# Patient Record
Sex: FEMALE | Race: WHITE | Employment: OTHER | URBAN - METROPOLITAN AREA
[De-identification: names, ages, dates, MRNs, and addresses within clinical notes are randomized per-mention and may not be internally consistent; named-entity substitution may affect disease eponyms.]

---

## 2017-01-09 ENCOUNTER — TELEPHONE (OUTPATIENT)
Dept: ORTHOPEDICS | Facility: CLINIC | Age: 72
End: 2017-01-09

## 2017-01-09 ENCOUNTER — MEDICAL CORRESPONDENCE (OUTPATIENT)
Dept: HEALTH INFORMATION MANAGEMENT | Facility: CLINIC | Age: 72
End: 2017-01-09

## 2017-01-09 NOTE — TELEPHONE ENCOUNTER
Faxed back a signed plan/updated plan of progress to [place] at [phone]. Fax confirmed and sent to abstracting.  Tiffanie Ledezma Clinical Medical Assistant

## 2017-02-24 ENCOUNTER — TELEPHONE (OUTPATIENT)
Dept: ORTHOPEDICS | Facility: CLINIC | Age: 72
End: 2017-02-24

## 2017-02-24 NOTE — TELEPHONE ENCOUNTER
Faxed back a physician update to [place] at [phone]. Fax confirmed and sent to abstracting.  Tiffanie Ledezma Certified Medical Assistant

## 2017-04-17 ENCOUNTER — OFFICE VISIT (OUTPATIENT)
Dept: ORTHOPEDICS | Facility: CLINIC | Age: 72
End: 2017-04-17
Payer: COMMERCIAL

## 2017-04-17 ENCOUNTER — RADIANT APPOINTMENT (OUTPATIENT)
Dept: GENERAL RADIOLOGY | Facility: CLINIC | Age: 72
End: 2017-04-17
Attending: ORTHOPAEDIC SURGERY
Payer: COMMERCIAL

## 2017-04-17 VITALS — HEIGHT: 68 IN | BODY MASS INDEX: 31.83 KG/M2 | RESPIRATION RATE: 16 BRPM | WEIGHT: 210 LBS

## 2017-04-17 DIAGNOSIS — M17.11 PRIMARY OSTEOARTHRITIS OF RIGHT KNEE: Primary | ICD-10-CM

## 2017-04-17 DIAGNOSIS — M25.561 ACUTE PAIN OF RIGHT KNEE: ICD-10-CM

## 2017-04-17 PROCEDURE — 99214 OFFICE O/P EST MOD 30 MIN: CPT | Mod: 25 | Performed by: ORTHOPAEDIC SURGERY

## 2017-04-17 PROCEDURE — 20610 DRAIN/INJ JOINT/BURSA W/O US: CPT | Mod: RT | Performed by: ORTHOPAEDIC SURGERY

## 2017-04-17 PROCEDURE — 73562 X-RAY EXAM OF KNEE 3: CPT | Mod: RT

## 2017-04-17 RX ORDER — METHYLPREDNISOLONE ACETATE 80 MG/ML
80 INJECTION, SUSPENSION INTRA-ARTICULAR; INTRALESIONAL; INTRAMUSCULAR; SOFT TISSUE ONCE
Qty: 1 ML | Refills: 0 | OUTPATIENT
Start: 2017-04-17 | End: 2017-04-17

## 2017-04-17 RX ORDER — OXYCODONE HYDROCHLORIDE 5 MG/1
TABLET ORAL
COMMUNITY
Start: 2016-04-03 | End: 2021-06-17

## 2017-04-17 RX ORDER — FUROSEMIDE 20 MG
20 TABLET ORAL
COMMUNITY
End: 2021-06-17

## 2017-04-17 ASSESSMENT — PAIN SCALES - GENERAL: PAINLEVEL: MILD PAIN (3)

## 2017-04-17 NOTE — PROGRESS NOTES
CHIEF COMPLAINT:   Chief Complaint   Patient presents with     Knee Pain     Right knee pain. Onset: 11/2016. NKI. Pain starts at the knee and goes up to the hip. She thinks it is a muscle. She is not able to do her exercises to keep her leg strong enough to stand. No treatments. Pain is anterior knee.        HISTORY OF PRESENT ILLNESS    Kamila Strauss is a 71 year old female seen for evaluation of ongoing right knee pain with no known injury.   Pain has been present for 5 months. Pain today is rated 3/10. She reports pains starts at the knee an goes up into the hip. Feels it may be a muscle that is causing her pain. She is unable to do exercises to keep her leg strong enough to stand. She has had no treatments. Pain is located over the anterior and medial knee. Pain is aggravated with doing exercises.     She is currently in nursing facility for her Parkinsons, diagnosed within the past year.    Present symptoms: mild pain.  Anterior/medial knee, thigh.  Pain severity: 3/10  Frequency of symptoms: frequently  Exacerbating Factors: with doing exercises  Relieving Factors: rest  Night Pain: No  Pain while at rest: No   Numbness or tingling: No   Patient has tried:     NSAIDS: No      Physical Therapy: Yes      Activity modification: No      Bracing: No      Injections: No     Ice: No      Assistive device:  No     Other: none    Orthopaedic PMH: previous history of left knee pain/ osteoarthritis with injections.    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); Ataxia (9/15/2013); Back pain; Basal cell carcinoma of anterior chest (8/25/2014); Cataract; Chronic constipation; Chronic low back pain (9/16/2013); Colon polyp; Colon polyp; Deep venous thrombosis (H) (9/16/2013); Diverticulosis; Former smoker (9/16/2013); Gait disorder (8/22/2014); Hip replacement; Hyperlipidemia LDL goal < 130; Hypothyroid; Inflammatory arthritis; Localization-related (focal)  (partial) epilepsy and epileptic syndromes with complex partial seizures, with intractable epilepsy; Localization-related (focal) (partial) epilepsy and epileptic syndromes with complex partial seizures, without mention of intractable epilepsy; Major depressive disorder, recurrent episode, unspecified; Malignant neoplasm of cerebrum, except lobes and ventricles (H); Osteoporosis (7/8/2013); Other convulsions; and Seizure disorder (H). She also has no past medical history of Amblyopia; Bleeding disorder (H); Diabetes mellitus (H); Diabetic retinopathy (H); Glaucoma (increased eye pressure); Heart disease; Hypertension; Kidney disease; Retinal detachment; Senile macular degeneration; Strabismus; Stroke (H); Surgical complications; Type II or unspecified type diabetes mellitus without mention of complication, not stated as uncontrolled; Unspecified asthma(493.90); or Uveitis.  Patient Active Problem List    Diagnosis Date Noted     Meibomian gland dysfunction 01/13/2015     Priority: Medium     Health Care Home (Active) 10/23/2014     Priority: Medium       Care Coordinator:  Rekha Don    See Letters for H Care Plan  Date:  October 23, 2014             Basal cell carcinoma of anterior chest 08/25/2014     Priority: Medium     Abnormal brain MRI 08/25/2014     Priority: Medium     Indication:  Brain tumor, followup.  Astrocytoma.    Comparison:  04/29/2014.    Technique:  Multiplanar T1-T2, FLAIR and diffusion-weighted imaging.  Post gadolinium T1 sequences.    Findings:  Compared to the prior exam, there is a stable mass within the right posterior frontal lobe at the vertex measuring approximately 3.1 x 3.8 cm in transverse and AP dimensions with predominantly low T1 and high T2 signal intensity.  There are internal areas of T2 shortening which may represent degraded blood products or calcification.  Post-contrast imaging again demonstrates heterogeneous enhancement.  There is stable mild T2 prolongation within  the white matter inferior to the mass consistent with edema.    Otherwise, moderate generalized cerebral volume loss.  Patchy areas of T2/FLAIR signal within the periventricular and subcortical white matter which likely represent small vessel ischemic changes.  No acute intracranial hemorrhage.  No abnormal ventricular dilatation.  Intracranial vascular flow voids are preserved.  No mass or mass effect.  No midline shift.  No restricted diffusion to suggest acute ischemia.  No new areas of abnormal enhancement or enhancing lesions.  Bilateral orbits are unremarkable.  Normal appearing sella.  Fluid and mucosal thickening nearly completely opacifies the paranasal sinuses.  The mastoid air cells are clear.    Impression:   1. No interval change.   2. Stable size and appearance of the heterogeneous enhancing tumor in the posterior right frontal lobe at the vertex.  No evidence of progression of tumor.   3. Moderate generalized cerebral volume loss.  Chronic deep white matter small vessel ischemic changes.   4. No acute intracranial abnormality.   5. Sinusitis.    Dictated by Denzel Jacinto MD @ Aug 14 2014 10:08AM    Signed by Dr. Denzel Jacinto @ Aug 14 2014 10:14AM         Gait disorder 08/22/2014     Priority: Medium     Impingement syndrome of right shoulder 10/07/2013     Priority: Medium     Menopause 09/16/2013     Priority: Medium     Vaginal bleeding 7/15/2002 s/p d and c       Malaria 09/16/2013     Priority: Medium     1997 after returning from [place]       Former smoker 09/16/2013     Priority: Medium     Rotator cuff injury 09/16/2013     Priority: Medium     right       Deep venous thrombosis (H) 09/16/2013     Priority: Medium     Chronic low back pain 09/16/2013     Priority: Medium     Pelvic fracture (H) 09/16/2013     Priority: Medium     10/16/2010       Astrocytoma (H) 09/15/2013     Priority: Medium     Managed by Dr Suraj Camara MD [facility].       Ataxia 09/15/2013     Priority: Medium      Closed fracture of condyle of humerus 07/25/2013     Priority: Medium     Closed fracture lateral condyle humerus 07/22/2013     Priority: Medium     Accidental fall on or from stairs or steps 07/08/2013     Priority: Medium     Osteoporosis 07/08/2013     Priority: Medium     Vertebral fracture L3 post fall 8/28/2012 09/04/2012     Priority: Medium     Dr Ha in [facility].         Bradycardia, chronic 09/04/2012     Priority: Medium     Asymptomatic  No AV block on ECG  Normal QT interval       Vestibular neuropathy 09/04/2012     Priority: Medium     H/o lower extremity weakness. Chronic use of a walker due to imbalance  rxd with meclizine in 1997       Seizure disorder (H) 09/04/2012     Priority: Medium     History of bilat pulmonary embolism 2010 09/04/2012     Priority: Medium     Previously on coumadin but was discontinued and asa started  Fall risk       Encounter for counseling 08/29/2012     Priority: Medium     Overview:   Patient has identified Health Care Agent(s): Yes  Add Health Care Agents: Yes  Spouse Shmuel.  Health Care Agent(s):  Primary Health Care Agent:  Relationship:  Phone:    Secondary Health Care Agent:  Relationship:  Phone:    Conservator:  Relationship:  Phone:    Guardian: Relationship:  Phone:      Patient has Advance Care Plan Documents (Health Care Directive, POLST):     Patient has identified Specific Treatment Preferences: Full       Depressive disorder 08/29/2012     Priority: Medium     Fall 08/29/2012     Priority: Medium     Closed fracture of lumbar vertebra (H) 08/29/2012     Priority: Medium     Counseling for marital and partner problems 05/09/2012     Priority: Medium     Problem list name updated by automated process. Provider to review       Partial epilepsy with impairment of consciousness (H) 04/23/2012     Priority: Medium     Problem list name updated by automated process. Provider to review       Falls frequently 04/23/2012     Priority: Medium      Status post right hip replacement 1987 and 1993 10/21/2011     Priority: Medium     Cataract 09/12/2011     Priority: Medium     Utility update for deleted IMO code  [employer] Update utility       Hypothyroidism 04/18/2011     Priority: Medium     Advanced directives, counseling/discussion 04/18/2011     Priority: Medium     Patient has completed an Advance/Health Care Directive (HCD), to bring in copy to be scanned into [facility].    Padmini Nichole  April 18, 2011    Reviewed POLST  Signed by provider on 09/28/2012.    Raven Bermudez CMA    Health Care Directive received, reviewed for clarity and legality, and will be scanned into the patient's EMR chart.  Raven Bermudez CMA  ACP Facilitator             Constipation 04/18/2011     Priority: Medium     Major depressive disorder, recurrent episode (H)      Priority: Medium     Problem list name updated by automated process. Provider to review       Malignant Neoplasm of Cerebrum, 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      Priority: Medium     Grade II astrocytoma diagnosed 1987; s/p biopsy but no resection and 30 XRT  Had left focal sz sept 1997 and focal sz 10/21/2011  Mri brain 9/97, 9/98,. 4/00, 1/30/03 at [facility] 2005 shows no change. But recent jan 2013 showed  A change and dr. Camara had done scan July 2013 and another is due in late September or early October 2013.   Last seen neurosurgery 12/97  Followed by dr. Suraj camara 2013       Hyperlipidemia with target LDL less than 130      Priority: Medium     Diagnosis updated by automated process. Provider to review and confirm.       Pulmonary embolism and infarction (H) 09/30/2009     Priority: Medium     Epilepsy (H) 09/30/2009     Priority: Medium       Surgical Hx:  has a past surgical history that includes COLONOSCOPY THRU STOMA W BIOPSY/CAUTERY TUMOR/POLYP/LESION; RECONSTRUC HIP SOCKET; TOTAL KNEE ARTHROPLASTY; and biopsy (8/04/15).    Medications:   Current Outpatient Prescriptions:       furosemide (LASIX) 20 MG tablet, Take 20 mg by mouth, Disp: , Rfl:      oxyCODONE (ROXICODONE) 5 MG IR tablet, , Disp: , Rfl:      carbidopa-levodopa (SINEMET CR)  MG per tablet, Take 1 tablet by mouth At Bedtime, Disp: 90 tablet, Rfl: 1     carbidopa-levodopa (SINEMET)  MG per tablet, Take 2 tablets by mouth 3 times daily Taking 7am,11am,4pm, Disp: 180 tablet, Rfl: 5     sertraline (ZOLOFT) 100 MG tablet, Take 1.5 tablets (150 mg) by mouth daily, Disp: 135 tablet, Rfl: 3     vitamin E 400 UNIT capsule, Take 1 capsule (400 Units) by mouth daily, Disp: 30 capsule, Rfl:      cholecalciferol (VITAMIN D) 1000 UNIT tablet, 1000 units daily, Disp: 30 tablet, Rfl:      senna-docusate (SENOKOT-S;PERICOLACE) 8.6-50 MG per tablet, Take 2 tablets by mouth daily as needed, Disp: , Rfl:      polyethylene glycol (MIRALAX/GLYCOLAX) packet, Take 17 g by mouth daily as needed, Disp: , Rfl:      ORDER FOR DME, Equipment being ordered: Wheelchair, Disp: 1 Device, Rfl: 1     levothyroxine (SYNTHROID, LEVOTHROID) 112 MCG tablet, Take 1 tablet (112 mcg) by mouth daily, Disp: 90 tablet, Rfl: 1     pentoxifylline (TRENTAL) 400 MG CR tablet, Take 400 mg by mouth 3 times daily (with meals), Disp: , Rfl:      lamoTRIgine (LAMICTAL) 100 MG tablet, Take 200 mg by mouth 2 times daily, Disp: , Rfl:      ORDER FOR DME, Equipment being ordered: Commode seat lift without handles., Disp: 1 each, Rfl: 0     levetiracetam (KEPPRA) 750 MG tablet, Take 750 mg by mouth 2 times daily., Disp: , Rfl:      levetiracetam (KEPPRA) 500 MG tablet, Take 500 mg by mouth 2 times daily., Disp: , Rfl:      acetaminophen (TYLENOL) 500 MG tablet, Take 2-3 tablets by mouth every 6 hours as needed. 4 tabs qhs prn , Disp: , Rfl:      FISH OIL, 3 tablets daily., Disp: , Rfl:      ASPIRIN 81 MG OR TABS, 1 TABLET DAILY*, Disp: , Rfl:      Ondansetron HCl (ZOFRAN PO), Take 8 mg by mouth, Disp: , Rfl:      UNABLE TO FIND, MEDICATION NAME: Uristat (phenazopyridine)  190 mg oral, three times a day PRN, Disp: , Rfl:      HYDROcodone-acetaminophen (NORCO) 5-325 MG per tablet, Take 1 tablet by mouth every 6 hours as needed for moderate to severe pain, Disp: , Rfl:      fluocinonide (LIDEX) 0.05 % cream, Apply  topically 2 times daily., Disp: 45 g, Rfl: 0     calcium carbonate (OS-MIKE 600 MG [place]. [place]) 600 MG TABS tablet, Take 1,000 mg by mouth daily, Disp: , Rfl:      lacosamide (VIMPAT) 50 MG TABS, 50 mg daily DOSE UNKNOWN; 1 tab in the morning and 1 tab in the evening, Disp: 60 tablet, Rfl: 11     hydrocortisone valerate (WEST-SIENNA) 0.2 % ointment, Apply topically 2 times daily Use up to two weeks then on an as needed basis to affected areas on face., Disp: 60 g, Rfl: 0     CALCIUM + D OR, 1 TABLET DAILY, Disp: , Rfl:      MAGNESIUM OR, 1 TABLET DAILY, Disp: , Rfl:     Allergies:   Allergies   Allergen Reactions     Nickel Rash     Simvastatin [Hmg-Coa-R Inhibitors] Other (See Comments)     Muscle aches       Social Hx: patient with Parkinson's disease. .   reports that she quit smoking about 7 years ago. She has never used smokeless tobacco. She reports that she drinks alcohol. She reports that she does not use illicit drugs.    Family Hx: family history includes CANCER in her father; Family History Negative in an other family member; Thyroid Disease in her mother. There is no history of DIABETES, Hypertension, CEREBROVASCULAR DISEASE, Glaucoma, or Macular Degeneration.     This document serves as a record of the services and decisions personally performed and made by Alcides Laurent MD. It was created on his behalf by Jenny Ornelas, a trained medical scribe. The creation of this document is based the provider's statements to the medical scribe.    Mayela Ornelas 11:16 AM 4/17/2017     REVIEW OF SYSTEMS:  CONSTITUTIONAL:NEGATIVE for fever, chills, change in weight  INTEGUMENTARY/SKIN: NEGATIVE for worrisome rashes, moles or lesions  MUSCULOSKELETAL:See HPI above  NEURO:  "NEGATIVE for weakness, dizziness or paresthesias    PHYSICAL EXAM:  Resp 16  Ht 1.727 m (5' 8\")  Wt 95.3 kg (210 lb)  BMI 31.93 kg/m2   GENERAL APPEARANCE: healthy, alert, no distress  SKIN: no suspicious lesions or rashes  NEURO: Normal strength and tone, mentation intact and speech normal  PSYCH:  mentation appears normal and affect normal, not anxious  RESPIRATORY: No increased work of breathing.  HANDS: no clubbing, nail pitting.    BILATERAL LOWER EXTREMITIES:  Gait: wheelchair bound.  No gross deformities or masses.  Bilateral Quad atrophy, strength weak  Intact sensation deep peroneal nerve, superficial peroneal nerve, med/lat tibial nerve, sural nerve, saphenous nerve  Intact EHL, EDL, TA, FHL, GS, quadriceps hamstrings and hip flexors  Toes warm and well perfused, brisk capillary refill. Palpable 2+ dp pulses.  Bilateral calf soft and nttp or squeeze.  Edema: none      LEFT KNEE EXAM:    Skin: intact, no ecchymosis or erythema  ROM: 5 degrees extension to 115 degrees flexion  Tight hamstrings on straight leg raise.  Effusion: trace  Tender: diffuse med/lat joint line, anterior and posterior knee  McMurrays: negative     MCL: stable, and non-painful at both 0 and 30 degrees knee flexion  Varus stress: stable, and non-painful at both 0 and 30 degrees knee flexion  Lachmans: neg, firm endpoint  Posterior Drawer stable  Patellofemoral joint:                Apprehension: negative              Crepitations: positive   Grind: positive.    RIGHT KNEE EXAM:    Skin: intact, no ecchymosis or erythema  ROM: 0 extension to 120 flexion  Tight hamstrings on straight leg raise.  Effusion: none  Tender: medial joint line   NTTP lat joint line, anterior or posterior knee    MCL: stable, and non-painful at both 0 and 30 degrees knee flexion  Varus stress: stable, and non-painful at both 0 and 30 degrees knee flexion  Lachmans: neg, firm endpoint  Posterior Drawer stable  Patellofemoral joint:                Apprehension: " "negative              Crepitations: mild   Grind: positive.    X-RAY:  3 views right knee from 4/17/2017 were reviewed in clinic today. On my review, no obvious fractures or dislocations. There is moderate tricompartmental degenerative changes with marginal osteophytes, subchondral sclerosis.    Impression:   71 year old female with right knee pain, primary osteoarthritis.    Plan:   * reviewed imaging studies with patient, showing arthritis. Arthritis is wearing of the cartilage due to longstanding \"wear and tear\" or can follow an injury to the joint.    Discussed typical symptoms of arthritic pain is pain aggravated with weight bearing activities, stiffness, relieved by sitting or rest. Swelling may be associated. It is common to have some grinding and popping in the knee with arthritis.    Non-surgical treatment for knee arthritis includes:    * rest, sitting  * Activity modification - avoid impact activities or activities that aggravate symptoms.  * NSAIDS (non-steroidal anti-inflammatory medications; e.g. Aleve, advil, motrin, ibuprofen) - regular use for inflammation ( twice daily or three times daily), with food, as long as no contra-indications Please discuss with primary care doctor if needed  * ice, 15-20 minutes at a time several times a day or as needed.  * Strengthening of quadriceps muscles  * Physical Therapy for strengthening, stretching and range of motion exercises of legs  * Tylenol as needed for pain, consider Tylenol arthritis or similar  * Weight loss: Weight loss:  Body mass index is 31.93 kg/(m^2).. weight loss benefits, not only for the current pain symptoms, but also overall health. Recommend a good diet plan that works for the patient, with the assistance of a dietician or primary care doctor as needed. Also, a good, low-impact exercise program for at least 20 minutes per day, 3 times per week, such as exercise bike, elliptical , or pool.  * Exercise: low impact such as stationary " "bike, elliptical, pool.  * Injections: cortisone versus viscosupplementation (hyaluronic acid, \"rooster comb\", \"gel shots\"); risks and perceived benefits discussed today. Patient elects to proceed.  * Bracing: bracing the knee may offer some relief of symptoms when worn and provide some stability.  * over the counter supplements such as glucosamine and chondroitin sulfate may help with joint pain.  * topical ointments may help as well    * return to clinic as needed.    PROCEDURE NOTE:  The risks, perceived benefits and potential complications (including but not limited to: bleeding, infection, pain, scar, damage to adjacent structures, atrophy or necrosis of soft tissue, skin blanching, failure to relieve symptoms, worsening of symptoms, allergic reaction) of injection were discussed with the patient. Questions were addressed and answered.The patient elected to proceed. Written informed consent was obtained. The correct procedural site was identified and confirmed. A Right Knee intraarticular injection was performed using 1mL Depo Medrol 80mg per mL and 7mL (4mL 1% lidocaine, 3mL 0.25% marcaine)  of local anesthetic after sterile prep, to the correct procedural site. Sterile bandaid applied. This was tolerated well by the patient. No apparent complications. Did also discuss that if diabetic, recommend close monitoring of blood sugars over the next week as cortisone injections can temporarily elevate blood sugars.    The information in this document, created by a scribe for me, accurately reflects the services I personally performed and the decisions made by me. I have reviewed and approved this document for accuracy.      Alcides Laurent M.D., M.S.  Dept. of Orthopaedic Surgery  [facility]        "

## 2017-04-17 NOTE — PROGRESS NOTES
The patient's right knee was prepped with betadine solution after verification of allergies. Area approximately 10 cm x 10 cm prepped in a sterile fashion. After injection, betadine removed with soap and water and band-aids applied.    1ml depo-medrol with 1% lidocaine plain and 0.25% marcaine plain injected into patient's right knee by Mich Schneider PA-C    Depo-medrol  LOT# [id number]  Exp. 06/2019    Lidocaine  LOT# -[id number]  Exp. 09/01/2018    Marcaine  LOT# -[id number]  Exp. 07/01/2018    Mich Schneider PA-C  Supervising physician: Shmuel Lemus MD  Dept. of Orthopedics  [facility]

## 2017-04-17 NOTE — PATIENT INSTRUCTIONS
Please remember to call and schedule a follow up appointment if one was recommended at your earliest convenience.  Orthopedics CLINIC HOURS TELEPHONE NUMBER   Dr. Anastasiia Moreno  Certified Medical Assistant   Monday & Wednesday   8am - 5pm  Thursday 1pm - 5pm  Friday 8am -11:30am Specialty schedulers:   [phone] to schedule your surgery.  Main Clinic:   [phone] to make an appointment with any provider.    Urgent Care locations:    [facility] Monday-Friday Closed  Saturday-Sunday 9am-5pm      Monday-Friday 12pm - 8pm  Saturday-Sunday 9am-5pm [phone]     If SURGERY has been recommended, please call our Specialty Schedulers at [phone] to schedule your procedure.    If you need a medication refill, please contact your pharmacy. Please allow 3 business days for your refill to be completed.    If an MRI or CT scan has been recommended, please call [facility] Schedulers at [phone] to schedule your appointment.  Use [facility] (secure e-mail communication and access to your chart) to send a message or to make an appointment. Please ask how you can sign up for [facility].  Your care team's suggested websites for health information:   [email or web address] : Up to date and easily searchable information on multiple topics.   [email or web address] : [place] dept of heat, public health issues in [place], N1N1

## 2017-04-17 NOTE — Clinical Note
4/17/2017       RE: Kamila Strauss  [address] [place] [address]           Dear Colleague,    Thank you for referring your patient, Kamila Strauss, to the [facility] [place]. Please see a copy of my visit note below.    The patient's right knee was prepped with betadine solution after verification of allergies. Area approximately 10 cm x 10 cm prepped in a sterile fashion. After injection, betadine removed with soap and water and band-aids applied.    1ml depo-medrol with 1% lidocaine plain and 0.25% marcaine plain injected into patient's right knee by Mich Schneider PA-C    Depo-medrol  LOT# [id number]  Exp. 06/2019    Lidocaine  LOT# -[id number]  Exp. 09/01/2018    Marcaine  LOT# -[id number]  Exp. 07/01/2018    Mich Schneider PA-C  Supervising physician: Shmuel Lemus MD  Dept. of Orthopedics  [facility]            CHIEF COMPLAINT:   Chief Complaint   Patient presents with     Knee Pain     Right knee pain. Onset: 11/2016. NKI. Pain starts at the knee and goes up to the hip. She thinks it is a muscle. She is not able to do her exercises to keep her leg strong enough to stand. No treatments. Pain is anterior knee.    .    HISTORY OF PRESENT ILLNESS    Kamila Strauss is a 71 year old female seen for evaluation of ongoing right knee pain with no known injury.   Pain has been present for 5 months. Pain today is rated 3/10. She reports pains starts at the knee an goes up into the hip. Feels it may be a muscle that is causing her pain. She is unable to do exercises to keep her leg strong enough to stand. She has had no treatments. Pain is located over the anterior knee. Pain is aggravated with doing exercises.     She is currently in nursing facility for her Parkinsons, diagnosed within the past year.    Present symptoms: mild pain.    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); Ataxia (9/15/2013); Back pain; Basal cell carcinoma of anterior chest (8/25/2014); Cataract; Chronic constipation; Chronic low back pain (9/16/2013); Colon polyp; Colon polyp; Deep venous thrombosis (H) (9/16/2013); Diverticulosis; Former smoker (9/16/2013); Gait disorder (8/22/2014); Hip replacement; Hyperlipidemia LDL goal < 130; Hypothyroid; Inflammatory arthritis; Localization-related (focal) (partial) epilepsy and epileptic syndromes with complex partial seizures, with intractable epilepsy; Localization-related (focal) (partial) epilepsy and epileptic syndromes with complex partial seizures, without mention of intractable epilepsy; Major depressive disorder, recurrent episode, unspecified; Malignant neoplasm of cerebrum, except lobes and ventricles (H); Osteoporosis (7/8/2013); Other convulsions; and Seizure disorder (H). She also has no past medical history of Amblyopia; Bleeding disorder (H); Diabetes mellitus (H); Diabetic retinopathy (H); Glaucoma (increased eye pressure); Heart disease; Hypertension; Kidney disease; Retinal detachment; Senile macular degeneration; Strabismus; Stroke (H); Surgical complications; Type II or unspecified type diabetes mellitus without mention of complication, not stated as uncontrolled; Unspecified asthma(493.90); or Uveitis.    Surgical Hx:  has a past surgical history that includes COLONOSCOPY THRU STOMA W BIOPSY/CAUTERY TUMOR/POLYP/LESION; RECONSTRUC HIP SOCKET; TOTAL KNEE ARTHROPLASTY; and biopsy (8/04/15).    Medications:   Current Outpatient Prescriptions:      furosemide  (LASIX) 20 MG tablet, Take 20 mg by mouth, Disp: , Rfl:      oxyCODONE (ROXICODONE) 5 MG IR tablet, , Disp: , Rfl:      carbidopa-levodopa (SINEMET CR)  MG per tablet, Take 1 tablet by mouth At Bedtime, Disp: 90 tablet, Rfl: 1     carbidopa-levodopa (SINEMET)  MG per tablet, Take 2 tablets by mouth 3 times daily Taking 7am,11am,4pm, Disp: 180 tablet, Rfl: 5     sertraline (ZOLOFT) 100 MG tablet, Take 1.5 tablets (150 mg) by mouth daily, Disp: 135 tablet, Rfl: 3     vitamin E 400 UNIT capsule, Take 1 capsule (400 Units) by mouth daily, Disp: 30 capsule, Rfl:      cholecalciferol (VITAMIN D) 1000 UNIT tablet, 1000 units daily, Disp: 30 tablet, Rfl:      senna-docusate (SENOKOT-S;PERICOLACE) 8.6-50 MG per tablet, Take 2 tablets by mouth daily as needed, Disp: , Rfl:      polyethylene glycol (MIRALAX/GLYCOLAX) packet, Take 17 g by mouth daily as needed, Disp: , Rfl:      ORDER FOR DME, Equipment being ordered: Wheelchair, Disp: 1 Device, Rfl: 1     levothyroxine (SYNTHROID, LEVOTHROID) 112 MCG tablet, Take 1 tablet (112 mcg) by mouth daily, Disp: 90 tablet, Rfl: 1     pentoxifylline (TRENTAL) 400 MG CR tablet, Take 400 mg by mouth 3 times daily (with meals), Disp: , Rfl:      lamoTRIgine (LAMICTAL) 100 MG tablet, Take 200 mg by mouth 2 times daily, Disp: , Rfl:      ORDER FOR DME, Equipment being ordered: Commode seat lift without handles., Disp: 1 each, Rfl: 0     levetiracetam (KEPPRA) 750 MG tablet, Take 750 mg by mouth 2 times daily., Disp: , Rfl:      levetiracetam (KEPPRA) 500 MG tablet, Take 500 mg by mouth 2 times daily., Disp: , Rfl:      acetaminophen (TYLENOL) 500 MG tablet, Take 2-3 tablets by mouth every 6 hours as needed. 4 tabs qhs prn , Disp: , Rfl:      FISH OIL, 3 tablets daily., Disp: , Rfl:      ASPIRIN 81 MG OR TABS, 1 TABLET DAILY*, Disp: , Rfl:      Ondansetron HCl (ZOFRAN PO), Take 8 mg by mouth, Disp: , Rfl:      UNABLE TO FIND, MEDICATION NAME: Uristat (phenazopyridine) 190 mg oral,  three times a day PRN, Disp: , Rfl:      HYDROcodone-acetaminophen (NORCO) 5-325 MG per tablet, Take 1 tablet by mouth every 6 hours as needed for moderate to severe pain, Disp: , Rfl:      fluocinonide (LIDEX) 0.05 % cream, Apply  topically 2 times daily., Disp: 45 g, Rfl: 0     calcium carbonate (OS-MIKE 600 MG [place]. [place]) 600 MG TABS tablet, Take 1,000 mg by mouth daily, Disp: , Rfl:      lacosamide (VIMPAT) 50 MG TABS, 50 mg daily DOSE UNKNOWN; 1 tab in the morning and 1 tab in the evening, Disp: 60 tablet, Rfl: 11     hydrocortisone valerate (WEST-SIENNA) 0.2 % ointment, Apply topically 2 times daily Use up to two weeks then on an as needed basis to affected areas on face., Disp: 60 g, Rfl: 0     CALCIUM + D OR, 1 TABLET DAILY, Disp: , Rfl:      MAGNESIUM OR, 1 TABLET DAILY, Disp: , Rfl:     Allergies:   Allergies   Allergen Reactions     Nickel Rash     Simvastatin [Hmg-Coa-R Inhibitors] Other (See Comments)     Muscle aches       Social Hx: patient with Parkinson's disease. .   reports that she quit smoking about 7 years ago. She has never used smokeless tobacco. She reports that she drinks alcohol. She reports that she does not use illicit drugs.    Family Hx: family history includes CANCER in her father; Family History Negative in an other family member; Thyroid Disease in her mother. There is no history of DIABETES, Hypertension, CEREBROVASCULAR DISEASE, Glaucoma, or Macular Degeneration.     This document serves as a record of the services and decisions personally performed and made by Alcides Laurent MD. It was created on his behalf by Jenny Ornelas, a trained medical scribe. The creation of this document is based the provider's statements to the medical scribe.    Ernstibmarychuy Ornelas 11:16 AM 4/17/2017     REVIEW OF SYSTEMS: 10 point ROS neg other than the symptoms noted above in the HPI and PMH. Notables include  CONSTITUTIONAL:NEGATIVE for fever, chills, change in weight  INTEGUMENTARY/SKIN: NEGATIVE for  "worrisome rashes, moles or lesions  MUSCULOSKELETAL:See HPI above  NEURO: NEGATIVE for weakness, dizziness or paresthesias    PHYSICAL EXAM:  Resp 16  Ht 1.727 m (5' 8\")  Wt 95.3 kg (210 lb)  BMI 31.93 kg/m2   GENERAL APPEARANCE: healthy, alert, no distress  SKIN: no suspicious lesions or rashes  NEURO: Normal strength and tone, mentation intact and speech normal  PSYCH:  mentation appears normal and affect normal, not anxious  RESPIRATORY: No increased work of breathing.  HANDS: no clubbing, nail pitting.    BILATERAL LOWER EXTREMITIES:  Gait: wheelchair bound.  No gross deformities or masses.  No Quad atrophy, strength normal.  Intact sensation deep peroneal nerve, superficial peroneal nerve, med/lat tibial nerve, sural nerve, saphenous nerve  Intact EHL, EDL, TA, FHL, GS, quadriceps hamstrings and hip flexors  Toes warm and well perfused, brisk capillary refill. Palpable 2+ dp pulses.  Bilateral calf soft and nttp or squeeze.  No palpable popliteal lymphadenopathy.  DTRs: achilles 2+, patella 2+.  Edema: none  Hips with full, pain-free motion. No irritability with flexion, adduction, and internal rotation.    LEFT KNEE EXAM:    Skin: intact, no ecchymosis or erythema  ROM: 5 degrees extension to 115 degrees flexion  Tight hamstrings on straight leg raise.  Effusion: trace  Tender: diffuse med/lat joint line, anterior or posterior knee  McMurrays: negative     MCL: stable, and non-painful at both 0 and 30 degrees knee flexion  Varus stress: stable, and non-painful at both 0 and 30 degrees knee flexion  Lachmans: neg, firm endpoint  Posterior Drawer stable  Patellofemoral joint:                Apprehension: negative              Crepitations: positive   Grind: positive.    RIGHT KNEE EXAM:    Skin: intact, no ecchymosis or erythema  Squat: 100 %, not limited by pain.     ROM: 0 extension to 120 flexion  Tight hamstrings on straight leg raise.  Effusion: none  Tender: medial joint line   NTTP lat joint line, " "anterior or posterior knee  McMurrays: negative    MCL: stable, and non-painful at both 0 and 30 degrees knee flexion  Varus stress: stable, and non-painful at both 0 and 30 degrees knee flexion  Lachmans: neg, firm endpoint  Posterior Drawer stable  Patellofemoral joint:                Apprehension: negative              Crepitations: minimal   Grind: positive.    X-RAY:  3 views right knee from 4/17/2017 were reviewed in clinic today. On my review, no obvious fractures or dislocations.     Impression:   71 year old female with right knee pain, osteoarthritis.    Plan:   * reviewed imaging studies with patient, showing arthritis. Arthritis is wearing of the cartilage due to longstanding \"wear and tear\" or can follow an injury to the joint.    Discussed typical symptoms of arthritic pain is pain aggravated with weight bearing activities, stiffness, relieved by sitting or rest. Swelling may be associated. It is common to have some grinding and popping in the knee with arthritis.    Workup for degenerative knee arthrosis and pain, typically starts with plain xrays of the knee. Xrays are usually all that is needed for evaluation of ongoing knee pain for arthritis, as they show the bony anatomy well and underlying degenerative changes. An MRI is not usually needed in cases of degenerative knee pain and arthritis, as degenerative cartilage and meniscal changes seen on MRI are expected, given findings on xray. MRI may be indicated if the arthritis is mild and there are mechanical symptoms such as locking or catching in the knee, or an acute knee injury.    Discussed various treatments for degenerative arthrosis of the knee, including nonoperative and operative approaches. Nonoperative approaches, which are exhausted prior to operative treatment, include doing nothing and living with the pain as patient has been doing, activity modification (avoid aggravating activities), physical therapy and strengthening exercises, " weight loss, anti-inflammatory medications, bracing, ambulatory aids (cane, walker) and injections. Once these have been tried and are deemed unsuccessful with a good effort, and the patient is an appropriate candidate, the next treatment would be knee arthroplasty or replacement. Depending on the location of the arthritis, knee replacement can be partial (one side of the knee affected by arthritis) or a total knee arthroplasty (all 3 compartments). The risks, perceived benefits and perioperative rehabilitation expectations of knee arthroplasty were discussed in detail. Also discussed that approximately 10% of patients that undergo knee arthroplasty are not happy following surgery and may have worse pain or no improvement in pain, contrary to their preoperative expectations.    At this time, nonoperative treatment will be pursued.  Non-surgical treatment for knee arthritis includes:    * rest, sitting  * Activity modification - avoid impact activities or activities that aggravate symptoms.  * NSAIDS (non-steroidal anti-inflammatory medications; e.g. Aleve, advil, motrin, ibuprofen) - regular use for inflammation ( twice daily or three times daily), with food, as long as no contra-indications Please discuss with primary care doctor if needed  * ice, 15-20 minutes at a time several times a day or as needed.  * Strengthening of quadriceps muscles  * Physical Therapy for strengthening, stretching and range of motion exercises of legs  * Tylenol as needed for pain, consider Tylenol arthritis or similar  * Weight loss: Weight loss:  Body mass index is 31.93 kg/(m^2).. weight loss benefits, not only for the current pain symptoms, but also overall health. Recommend a good diet plan that works for the patient, with the assistance of a dietician or primary care doctor as needed. Also, a good, low-impact exercise program for at least 20 minutes per day, 3 times per week, such as exercise bike, elliptical , or pool.  *  "Exercise: low impact such as stationary bike, elliptical, pool.  * Injections: cortisone versus viscosupplementation (hyaluronic acid, \"rooster comb\", \"gel shots\"); risks and perceived benefits discussed today. Patient elects to proceed.  * Bracing: bracing the knee may offer some relief of symptoms when worn and provide some stability.  * over the counter supplements such as glucosamine and chondroitin sulfate may help with joint pain.  * topical ointments may help as well    * return to clinic as needed.    PROCEDURE NOTE:  The risks, perceived benefits and potential complications (including but not limited to: bleeding, infection, pain, scar, damage to adjacent structures, atrophy or necrosis of soft tissue, skin blanching, failure to relieve symptoms, worsening of symptoms, allergic reaction) of injection were discussed with the patient. Questions were addressed and answered.The patient elected to proceed. Written informed consent was obtained. The correct procedural site was identified and confirmed. A Right Knee intraarticular injection was performed using 1mL Depo Medrol 80mg per mL and 7mL (4mL 1% lidocaine, 3mL 0.25% marcaine)  of local anesthetic after sterile prep, to the correct procedural site. Sterile bandaid applied. This was tolerated well by the patient. No apparent complications. Did also discuss that if diabetic, recommend close monitoring of blood sugars over the next week as cortisone injections can temporarily elevate blood sugars.    The information in this document, created by a scribe for me, accurately reflects the services I personally performed and the decisions made by me. I have reviewed and approved this document for accuracy.      Alcides Laurent M.D., M.S.  Dept. of Orthopaedic Surgery  [facility]       Alcides Laurent M.D., M.S.  Dept. of Orthopaedic Surgery  [facility]          Again, thank you for allowing me to participate in the care of your patient.  "       Sincerely,              Alcides Laurent MD

## 2017-04-17 NOTE — MR AVS SNAPSHOT
After Visit Summary   4/17/2017    Kamila Strauss    MRN: [id number]           Patient Information     Date Of Birth          1945        Visit Information        Provider Department      4/17/2017 10:15 AM Alcides Laurent MD [facility]        Today's Diagnoses     Primary osteoarthritis of right knee    -  1    Acute pain of right knee          Care Instructions    Please remember to call and schedule a follow up appointment if one was recommended at your earliest convenience.  Orthopedics CLINIC HOURS TELEPHONE NUMBER   Dr. Anastasiia Moreno  Certified Medical Assistant   Monday & Wednesday   8am - 5pm  Thursday 1pm - 5pm  Friday 8am -11:30am Specialty schedulers:   [phone] to schedule your surgery.  Main Clinic:   [phone] to make an appointment with any provider.    Urgent Care locations:    [facility] Monday-Friday Closed  Saturday-Sunday 9am-5pm      Monday-Friday 12pm - 8pm  Saturday-Sunday 9am-5pm [phone]     If SURGERY has been recommended, please call our Specialty Schedulers at [phone] to schedule your procedure.    If you need a medication refill, please contact your pharmacy. Please allow 3 business days for your refill to be completed.    If an MRI or CT scan has been recommended, please call [facility] Schedulers at [phone] to schedule your appointment.  Use [employer] (secure e-mail communication and access to your chart) to send a message or to make an appointment. Please ask how you can sign up for [employer].  Your care team's suggested websites for health information:   Www.8bit.org : Up to date and easily searchable information on multiple topics.   [email or web address] : [place] dept of heat, public health issues in [place], N1N1            Follow-ups after your visit        Follow-up notes from your care team     Return if symptoms worsen or fail to  "improve.      Who to contact     If you have questions or need follow up information about today's clinic visit or your schedule please contact [facility] directly at [phone].  Normal or non-critical lab and imaging results will be communicated to you by MyChart, letter or phone within 4 business days after the clinic has received the results. If you do not hear from us within 7 days, please contact the clinic through [employer] or phone. If you have a critical or abnormal lab result, we will notify you by phone as soon as possible.  Submit refill requests through [employer] or call your pharmacy and they will forward the refill request to us. Please allow 3 business days for your refill to be completed.          Additional Information About Your Visit        [employer] Information     [employer] gives you secure access to your electronic health record. If you see a primary care provider, you can also send messages to your care team and make appointments. If you have questions, please call your primary care clinic.  If you do not have a primary care provider, please call [phone] and they will assist you.        Care EveryWhere ID     This is your Care EveryWhere ID. This could be used by other organizations to access your [facility] medical records  [id number]        Your Vitals Were     Respirations Height BMI (Body Mass Index)             16 5' 8\" (1.727 m) 31.93 kg/m2          Blood Pressure from Last 3 Encounters:   11/03/14 140/90   09/03/14 130/82   08/25/14 133/77    Weight from Last 3 Encounters:   04/17/17 210 lb (95.3 kg)   10/10/16 210 lb (95.3 kg)   08/31/15 210 lb (95.3 kg)              We Performed the Following     DRAIN/INJECT LARGE JOINT/BURSA     METHYLPREDNISOLONE 80 MG INJ          Today's Medication Changes          These changes are accurate as of: 4/17/17  4:10 PM.  If you have any questions, ask your nurse or doctor.               Start taking these medicines.  "       Dose/Directions    methylPREDNISolone acetate 80 MG/ML injection   Commonly known as:  DEPO-MEDROL   Used for:  Acute pain of right knee   Started by:  Alcides Laurent MD        Dose:  80 mg   1 mL (80 mg) by INTRA-ARTICULAR route once for 1 dose   Quantity:  1 mL   Refills:  0            Where to get your medicines      Some of these will need a paper prescription and others can be bought over the counter.  Ask your nurse if you have questions.     You don't need a prescription for these medications     methylPREDNISolone acetate 80 MG/ML injection                Primary Care Provider Office Phone # Fax #    Ashley Dumont Naveed Reed [phone]       [facility] [id number]  [facility] [address]        Thank you!     Thank you for choosing [facility]  for your care. Our goal is always to provide you with excellent care. Hearing back from our patients is one way we can continue to improve our services. Please take a few minutes to complete the written survey that you may receive in the mail after your visit with us. Thank you!             Your Updated Medication List - Protect others around you: Learn how to safely use, store and throw away your medicines at www.disposemymeds.org.          This list is accurate as of: 4/17/17  4:10 PM.  Always use your most recent med list.                   Brand Name Dispense Instructions for use    acetaminophen 500 MG tablet    TYLENOL     Take 2-3 tablets by mouth every 6 hours as needed. 4 tabs qhs prn       aspirin 81 MG tablet      1 TABLET DAILY*       CALCIUM + D PO      1 TABLET DAILY       calcium carbonate 600 MG tablet    OS-MIKE 600 mg Samish. Ca     Take 1,000 mg by mouth daily       * carbidopa-levodopa  MG per CR tablet    SINEMET CR    90 tablet    Take 1 tablet by mouth At Bedtime       * carbidopa-levodopa  MG per tablet    SINEMET    180 tablet    Take 2 tablets by  mouth 3 times daily Taking 7am,11am,4pm       cholecalciferol 1000 UNIT tablet    vitamin D    30 tablet    1000 units daily       FISH OIL      3 tablets daily.       fluocinonide 0.05 % cream    LIDEX    45 g    Apply  topically 2 times daily.       furosemide 20 MG tablet    LASIX     Take 20 mg by mouth       HYDROcodone-acetaminophen 5-325 MG per tablet    NORCO     Take 1 tablet by mouth every 6 hours as needed for moderate to severe pain       hydrocortisone valerate 0.2 % ointment    WEST-SIENNA    60 g    Apply topically 2 times daily Use up to two weeks then on an as needed basis to affected areas on face.       lamoTRIgine 100 MG tablet    LaMICtal     Take 200 mg by mouth 2 times daily       * levETIRAcetam 750 MG tablet    KEPPRA     Take 750 mg by mouth 2 times daily.       * levETIRAcetam 500 MG tablet    KEPPRA     Take 500 mg by mouth 2 times daily.       levothyroxine 112 MCG tablet    SYNTHROID/LEVOTHROID    90 tablet    Take 1 tablet (112 mcg) by mouth daily       MAGNESIUM PO      1 TABLET DAILY       methylPREDNISolone acetate 80 MG/ML injection    DEPO-MEDROL    1 mL    1 mL (80 mg) by INTRA-ARTICULAR route once for 1 dose       order for DME     1 each    Equipment being ordered: Commode seat lift without handles.       order for DME     1 Device    Equipment being ordered: Wheelchair       oxyCODONE 5 MG IR tablet    ROXICODONE         polyethylene glycol Packet    MIRALAX/GLYCOLAX     Take 17 g by mouth daily as needed       senna-docusate 8.6-50 MG per tablet    SENOKOT-S;PERICOLACE     Take 2 tablets by mouth daily as needed       sertraline 100 MG tablet    ZOLOFT    135 tablet    Take 1.5 tablets (150 mg) by mouth daily       TRENTAL 400 MG CR tablet   Generic drug:  pentoxifylline      Take 400 mg by mouth 3 times daily (with meals)       UNABLE TO FIND      MEDICATION NAME: Uristat (phenazopyridine) 190 mg oral, three times a day PRN       VIMPAT 50 MG Tabs tablet   Generic drug:   lacosamide     60 tablet    50 mg daily DOSE UNKNOWN; 1 tab in the morning and 1 tab in the evening       vitamin E 400 UNIT capsule     30 capsule    Take 1 capsule (400 Units) by mouth daily       ZOFRAN PO      Take 8 mg by mouth       * Notice:  This list has 4 medication(s) that are the same as other medications prescribed for you. Read the directions carefully, and ask your doctor or other care provider to review them with you.

## 2017-04-17 NOTE — NURSING NOTE
"Chief Complaint   Patient presents with     Knee Pain     Right knee pain. Onset: 11/2016. NKI. Pain starts at the knee and goes up to the hip. She thinks it is a muscle. She is not able to do her exercises to keep her leg strong enough to stand. No treatments. Pain is anterior knee.        Initial Resp 16  Ht 1.727 m (5' 8\")  Wt 95.3 kg (210 lb)  BMI 31.93 kg/m2 Estimated body mass index is 31.93 kg/(m^2) as calculated from the following:    Height as of this encounter: 1.727 m (5' 8\").    Weight as of this encounter: 95.3 kg (210 lb).  Medication Reconciliation: shashank Ledezma Certified Medical Assistant    "

## 2017-04-17 NOTE — LETTER
4/17/2017      RE: Kamila Strauss  [address] [facility] [place] [address]       The patient's right knee was prepped with betadine solution after verification of allergies. Area approximately 10 cm x 10 cm prepped in a sterile fashion. After injection, betadine removed with soap and water and band-aids applied.    1ml depo-medrol with 1% lidocaine plain and 0.25% marcaine plain injected into patient's right knee by Mich Schneider PA-C    Depo-medrol  LOT# [id number]  Exp. 06/2019    Lidocaine  LOT# -[id number]  Exp. 09/01/2018    Marcaine  LOT# -[id number]  Exp. 07/01/2018    Mich Schneider PA-C  Supervising physician: Shmuel Lemus MD  Dept. of Orthopedics  [facility]            CHIEF COMPLAINT:   Chief Complaint   Patient presents with     Knee Pain     Right knee pain. Onset: 11/2016. NKI. Pain starts at the knee and goes up to the hip. She thinks it is a muscle. She is not able to do her exercises to keep her leg strong enough to stand. No treatments. Pain is anterior knee.        HISTORY OF PRESENT ILLNESS    Kamila Strauss is a 71 year old female seen for evaluation of ongoing right knee pain with no known injury.   Pain has been present for 5 months. Pain today is rated 3/10. She reports pains starts at the knee an goes up into the hip. Feels it may be a muscle that is causing her pain. She is unable to do exercises to keep her leg strong enough to stand. She has had no treatments. Pain is located over the anterior and medial knee. Pain is aggravated with doing exercises.     She is currently in nursing facility for her Parkinsons, diagnosed within the past year.    Present symptoms: mild pain.  Anterior/medial knee, thigh.  Pain severity: 3/10  Frequency of symptoms: frequently  Exacerbating Factors: with doing exercises  Relieving Factors: rest  Night Pain: No  Pain while at rest: No   Numbness or tingling: No   Patient has tried:     NSAIDS: No      Physical Therapy: Yes      Activity  modification: No      Bracing: No      Injections: No     Ice: No      Assistive device:  No     Other: none    Orthopaedic PMH: previous history of left knee pain/ osteoarthritis with injections.    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); Ataxia (9/15/2013); Back pain; Basal cell carcinoma of anterior chest (8/25/2014); Cataract; Chronic constipation; Chronic low back pain (9/16/2013); Colon polyp; Colon polyp; Deep venous thrombosis (H) (9/16/2013); Diverticulosis; Former smoker (9/16/2013); Gait disorder (8/22/2014); Hip replacement; Hyperlipidemia LDL goal < 130; Hypothyroid; Inflammatory arthritis; Localization-related (focal) (partial) epilepsy and epileptic syndromes with complex partial seizures, with intractable epilepsy; Localization-related (focal) (partial) epilepsy and epileptic syndromes with complex partial seizures, without mention of intractable epilepsy; Major depressive disorder, recurrent episode, unspecified; Malignant neoplasm of cerebrum, except lobes and ventricles (H); Osteoporosis (7/8/2013); Other convulsions; and Seizure disorder (H). She also has no past medical history of Amblyopia; Bleeding disorder (H); Diabetes mellitus (H); Diabetic retinopathy (H); Glaucoma (increased eye pressure); Heart disease; Hypertension; Kidney disease; Retinal detachment; Senile macular degeneration; Strabismus; Stroke (H); Surgical complications; Type II or unspecified type diabetes mellitus without mention of complication, not stated as uncontrolled; Unspecified asthma(493.90); or Uveitis.  Patient Active Problem List    Diagnosis Date Noted     Meibomian gland dysfunction 01/13/2015     Priority: Medium     Health Care Home (Active) 10/23/2014     Priority: Medium       Care Coordinator:  Rekha Don    See Letters for [facility] Care Plan  Date:  October 23, 2014             Basal cell carcinoma of anterior chest 08/25/2014     Priority: Medium      Abnormal brain MRI 08/25/2014     Priority: Medium     Indication:  Brain tumor, followup.  Astrocytoma.    Comparison:  04/29/2014.    Technique:  Multiplanar T1-T2, FLAIR and diffusion-weighted imaging.  Post gadolinium T1 sequences.    Findings:  Compared to the prior exam, there is a stable mass within the right posterior frontal lobe at the vertex measuring approximately 3.1 x 3.8 cm in transverse and AP dimensions with predominantly low T1 and high T2 signal intensity.  There are internal areas of T2 shortening which may represent degraded blood products or calcification.  Post-contrast imaging again demonstrates heterogeneous enhancement.  There is stable mild T2 prolongation within the white matter inferior to the mass consistent with edema.    Otherwise, moderate generalized cerebral volume loss.  Patchy areas of T2/FLAIR signal within the periventricular and subcortical white matter which likely represent small vessel ischemic changes.  No acute intracranial hemorrhage.  No abnormal ventricular dilatation.  Intracranial vascular flow voids are preserved.  No mass or mass effect.  No midline shift.  No restricted diffusion to suggest acute ischemia.  No new areas of abnormal enhancement or enhancing lesions.  Bilateral orbits are unremarkable.  Normal appearing sella.  Fluid and mucosal thickening nearly completely opacifies the paranasal sinuses.  The mastoid air cells are clear.    Impression:   1. No interval change.   2. Stable size and appearance of the heterogeneous enhancing tumor in the posterior right frontal lobe at the vertex.  No evidence of progression of tumor.   3. Moderate generalized cerebral volume loss.  Chronic deep white matter small vessel ischemic changes.   4. No acute intracranial abnormality.   5. Sinusitis.    Dictated by Denzel Jacinto MD @ Aug 14 2014 10:08AM    Signed by Dr. Denzel Jacinto @ Aug 14 2014 10:14AM         Gait disorder 08/22/2014     Priority: Medium     Impingement  syndrome of right shoulder 10/07/2013     Priority: Medium     Menopause 09/16/2013     Priority: Medium     Vaginal bleeding 7/15/2002 s/p d and c       Malaria 09/16/2013     Priority: Medium     1997 after returning from [place]       Former smoker 09/16/2013     Priority: Medium     Rotator cuff injury 09/16/2013     Priority: Medium     right       Deep venous thrombosis (H) 09/16/2013     Priority: Medium     Chronic low back pain 09/16/2013     Priority: Medium     Pelvic fracture (H) 09/16/2013     Priority: Medium     10/16/2010       Astrocytoma (H) 09/15/2013     Priority: Medium     Managed by Dr Suraj Camara MD [facility].       Ataxia 09/15/2013     Priority: Medium     Closed fracture of condyle of humerus 07/25/2013     Priority: Medium     Closed fracture lateral condyle humerus 07/22/2013     Priority: Medium     Accidental fall on or from stairs or steps 07/08/2013     Priority: Medium     Osteoporosis 07/08/2013     Priority: Medium     Vertebral fracture L3 post fall 8/28/2012 09/04/2012     Priority: Medium     Dr Ha in [facility].         Bradycardia, chronic 09/04/2012     Priority: Medium     Asymptomatic  No AV block on ECG  Normal QT interval       Vestibular neuropathy 09/04/2012     Priority: Medium     H/o lower extremity weakness. Chronic use of a walker due to imbalance  rxd with meclizine in 1997       Seizure disorder (H) 09/04/2012     Priority: Medium     History of bilat pulmonary embolism 2010 09/04/2012     Priority: Medium     Previously on coumadin but was discontinued and asa started  Fall risk       Encounter for counseling 08/29/2012     Priority: Medium     Overview:   Patient has identified Health Care Agent(s): Yes  Add Health Care Agents: Yes  Spouse Shmuel.  Health Care Agent(s):  Primary Health Care Agent:  Relationship:  Phone:    Secondary Health Care Agent:  Relationship:  Phone:    Conservator:  Relationship:  Phone:    Guardian:  Relationship:  Phone:      Patient has Advance Care Plan Documents (Health Care Directive, POLST):     Patient has identified Specific Treatment Preferences: Full       Depressive disorder 08/29/2012     Priority: Medium     Fall 08/29/2012     Priority: Medium     Closed fracture of lumbar vertebra (H) 08/29/2012     Priority: Medium     Counseling for marital and partner problems 05/09/2012     Priority: Medium     Problem list name updated by automated process. Provider to review       Partial epilepsy with impairment of consciousness (H) 04/23/2012     Priority: Medium     Problem list name updated by automated process. Provider to review       Falls frequently 04/23/2012     Priority: Medium     Status post right hip replacement 1987 and 1993 10/21/2011     Priority: Medium     Cataract 09/12/2011     Priority: Medium     Utility update for deleted IMO code  Im[employer] Update utility       Hypothyroidism 04/18/2011     Priority: Medium     Advanced directives, counseling/discussion 04/18/2011     Priority: Medium     Patient has completed an Advance/Health Care Directive (HCD), to bring in copy to be scanned into [employer].    Padmini Nichole  April 18, 2011    Reviewed POLST  Signed by provider on 09/28/2012.    Raven Bermudez CMA    Health Care Directive received, reviewed for clarity and legality, and will be scanned into the patient's EMR chart.  Raven Bermudez CMA  ACP Facilitator             Constipation 04/18/2011     Priority: Medium     Major depressive disorder, recurrent episode (H)      Priority: Medium     Problem list name updated by automated process. Provider to review       Malignant Neoplasm of Cerebrum, 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      Priority: Medium     Grade II astrocytoma diagnosed 1987; s/p biopsy but no resection and 30 XRT  Had left focal sz sept 1997 and focal sz 10/21/2011  Mri brain 9/97, 9/98,. 4/00, 1/30/03 at [facility] 2005 shows no change. But recent jan 2013  showed  A change and dr. Camara had done scan July 2013 and another is due in late September or early October 2013.   Last seen neurosurgery 12/97  Followed by dr. Suraj camara 2013       Hyperlipidemia with target LDL less than 130      Priority: Medium     Diagnosis updated by automated process. Provider to review and confirm.       Pulmonary embolism and infarction (H) 09/30/2009     Priority: Medium     Epilepsy (H) 09/30/2009     Priority: Medium       Surgical Hx:  has a past surgical history that includes COLONOSCOPY THRU STOMA W BIOPSY/CAUTERY TUMOR/POLYP/LESION; RECONSTRUC HIP SOCKET; TOTAL KNEE ARTHROPLASTY; and biopsy (8/04/15).    Medications:   Current Outpatient Prescriptions:      furosemide (LASIX) 20 MG tablet, Take 20 mg by mouth, Disp: , Rfl:      oxyCODONE (ROXICODONE) 5 MG IR tablet, , Disp: , Rfl:      carbidopa-levodopa (SINEMET CR)  MG per tablet, Take 1 tablet by mouth At Bedtime, Disp: 90 tablet, Rfl: 1     carbidopa-levodopa (SINEMET)  MG per tablet, Take 2 tablets by mouth 3 times daily Taking 7am,11am,4pm, Disp: 180 tablet, Rfl: 5     sertraline (ZOLOFT) 100 MG tablet, Take 1.5 tablets (150 mg) by mouth daily, Disp: 135 tablet, Rfl: 3     vitamin E 400 UNIT capsule, Take 1 capsule (400 Units) by mouth daily, Disp: 30 capsule, Rfl:      cholecalciferol (VITAMIN D) 1000 UNIT tablet, 1000 units daily, Disp: 30 tablet, Rfl:      senna-docusate (SENOKOT-S;PERICOLACE) 8.6-50 MG per tablet, Take 2 tablets by mouth daily as needed, Disp: , Rfl:      polyethylene glycol (MIRALAX/GLYCOLAX) packet, Take 17 g by mouth daily as needed, Disp: , Rfl:      ORDER FOR DME, Equipment being ordered: Wheelchair, Disp: 1 Device, Rfl: 1     levothyroxine (SYNTHROID, LEVOTHROID) 112 MCG tablet, Take 1 tablet (112 mcg) by mouth daily, Disp: 90 tablet, Rfl: 1     pentoxifylline (TRENTAL) 400 MG CR tablet, Take 400 mg by mouth 3 times daily (with meals), Disp: , Rfl:      lamoTRIgine (LAMICTAL) 100  MG tablet, Take 200 mg by mouth 2 times daily, Disp: , Rfl:      ORDER FOR DME, Equipment being ordered: Commode seat lift without handles., Disp: 1 each, Rfl: 0     levetiracetam (KEPPRA) 750 MG tablet, Take 750 mg by mouth 2 times daily., Disp: , Rfl:      levetiracetam (KEPPRA) 500 MG tablet, Take 500 mg by mouth 2 times daily., Disp: , Rfl:      acetaminophen (TYLENOL) 500 MG tablet, Take 2-3 tablets by mouth every 6 hours as needed. 4 tabs qhs prn , Disp: , Rfl:      FISH OIL, 3 tablets daily., Disp: , Rfl:      ASPIRIN 81 MG OR TABS, 1 TABLET DAILY*, Disp: , Rfl:      Ondansetron HCl (ZOFRAN PO), Take 8 mg by mouth, Disp: , Rfl:      UNABLE TO FIND, MEDICATION NAME: Uristat (phenazopyridine) 190 mg oral, three times a day PRN, Disp: , Rfl:      HYDROcodone-acetaminophen (NORCO) 5-325 MG per tablet, Take 1 tablet by mouth every 6 hours as needed for moderate to severe pain, Disp: , Rfl:      fluocinonide (LIDEX) 0.05 % cream, Apply  topically 2 times daily., Disp: 45 g, Rfl: 0     calcium carbonate (OS-MIKE 600 MG [place]. CA) 600 MG TABS tablet, Take 1,000 mg by mouth daily, Disp: , Rfl:      lacosamide (VIMPAT) 50 MG TABS, 50 mg daily DOSE UNKNOWN; 1 tab in the morning and 1 tab in the evening, Disp: 60 tablet, Rfl: 11     hydrocortisone valerate (WEST-SIENNA) 0.2 % ointment, Apply topically 2 times daily Use up to two weeks then on an as needed basis to affected areas on face., Disp: 60 g, Rfl: 0     CALCIUM + D OR, 1 TABLET DAILY, Disp: , Rfl:      MAGNESIUM OR, 1 TABLET DAILY, Disp: , Rfl:     Allergies:   Allergies   Allergen Reactions     Nickel Rash     Simvastatin [Hmg-Coa-R Inhibitors] Other (See Comments)     Muscle aches       Social Hx: patient with Parkinson's disease. .   reports that she quit smoking about 7 years ago. She has never used smokeless tobacco. She reports that she drinks alcohol. She reports that she does not use illicit drugs.    Family Hx: family history includes CANCER in her  "father; Family History Negative in an other family member; Thyroid Disease in her mother. There is no history of DIABETES, Hypertension, CEREBROVASCULAR DISEASE, Glaucoma, or Macular Degeneration.     This document serves as a record of the services and decisions personally performed and made by Alcides Laurent MD. It was created on his behalf by Jenny Ornelas, a trained medical scribe. The creation of this document is based the provider's statements to the medical scribe.    Scribe Jenny Ornelas 11:16 AM 4/17/2017     REVIEW OF SYSTEMS:  CONSTITUTIONAL:NEGATIVE for fever, chills, change in weight  INTEGUMENTARY/SKIN: NEGATIVE for worrisome rashes, moles or lesions  MUSCULOSKELETAL:See HPI above  NEURO: NEGATIVE for weakness, dizziness or paresthesias    PHYSICAL EXAM:  Resp 16  Ht 1.727 m (5' 8\")  Wt 95.3 kg (210 lb)  BMI 31.93 kg/m2   GENERAL APPEARANCE: healthy, alert, no distress  SKIN: no suspicious lesions or rashes  NEURO: Normal strength and tone, mentation intact and speech normal  PSYCH:  mentation appears normal and affect normal, not anxious  RESPIRATORY: No increased work of breathing.  HANDS: no clubbing, nail pitting.    BILATERAL LOWER EXTREMITIES:  Gait: wheelchair bound.  No gross deformities or masses.  Bilateral Quad atrophy, strength weak  Intact sensation deep peroneal nerve, superficial peroneal nerve, med/lat tibial nerve, sural nerve, saphenous nerve  Intact EHL, EDL, TA, FHL, GS, quadriceps hamstrings and hip flexors  Toes warm and well perfused, brisk capillary refill. Palpable 2+ dp pulses.  Bilateral calf soft and nttp or squeeze.  Edema: none      LEFT KNEE EXAM:    Skin: intact, no ecchymosis or erythema  ROM: 5 degrees extension to 115 degrees flexion  Tight hamstrings on straight leg raise.  Effusion: trace  Tender: diffuse med/lat joint line, anterior and posterior knee  McMurrays: negative     MCL: stable, and non-painful at both 0 and 30 degrees knee flexion  Varus stress: stable, and " "non-painful at both 0 and 30 degrees knee flexion  Lachmans: neg, firm endpoint  Posterior Drawer stable  Patellofemoral joint:                Apprehension: negative              Crepitations: positive   Grind: positive.    RIGHT KNEE EXAM:    Skin: intact, no ecchymosis or erythema  ROM: 0 extension to 120 flexion  Tight hamstrings on straight leg raise.  Effusion: none  Tender: medial joint line   NTTP lat joint line, anterior or posterior knee    MCL: stable, and non-painful at both 0 and 30 degrees knee flexion  Varus stress: stable, and non-painful at both 0 and 30 degrees knee flexion  Lachmans: neg, firm endpoint  Posterior Drawer stable  Patellofemoral joint:                Apprehension: negative              Crepitations: mild   Grind: positive.    X-RAY:  3 views right knee from 4/17/2017 were reviewed in clinic today. On my review, no obvious fractures or dislocations. There is moderate tricompartmental degenerative changes with marginal osteophytes, subchondral sclerosis.    Impression:   71 year old female with right knee pain, primary osteoarthritis.    Plan:   * reviewed imaging studies with patient, showing arthritis. Arthritis is wearing of the cartilage due to longstanding \"wear and tear\" or can follow an injury to the joint.    Discussed typical symptoms of arthritic pain is pain aggravated with weight bearing activities, stiffness, relieved by sitting or rest. Swelling may be associated. It is common to have some grinding and popping in the knee with arthritis.    Non-surgical treatment for knee arthritis includes:    * rest, sitting  * Activity modification - avoid impact activities or activities that aggravate symptoms.  * NSAIDS (non-steroidal anti-inflammatory medications; e.g. Aleve, advil, motrin, ibuprofen) - regular use for inflammation ( twice daily or three times daily), with food, as long as no contra-indications Please discuss with primary care doctor if needed  * ice, 15-20 minutes " "at a time several times a day or as needed.  * Strengthening of quadriceps muscles  * Physical Therapy for strengthening, stretching and range of motion exercises of legs  * Tylenol as needed for pain, consider Tylenol arthritis or similar  * Weight loss: Weight loss:  Body mass index is 31.93 kg/(m^2).. weight loss benefits, not only for the current pain symptoms, but also overall health. Recommend a good diet plan that works for the patient, with the assistance of a dietician or primary care doctor as needed. Also, a good, low-impact exercise program for at least 20 minutes per day, 3 times per week, such as exercise bike, elliptical , or pool.  * Exercise: low impact such as stationary bike, elliptical, pool.  * Injections: cortisone versus viscosupplementation (hyaluronic acid, \"rooster comb\", \"gel shots\"); risks and perceived benefits discussed today. Patient elects to proceed.  * Bracing: bracing the knee may offer some relief of symptoms when worn and provide some stability.  * over the counter supplements such as glucosamine and chondroitin sulfate may help with joint pain.  * topical ointments may help as well    * return to clinic as needed.    PROCEDURE NOTE:  The risks, perceived benefits and potential complications (including but not limited to: bleeding, infection, pain, scar, damage to adjacent structures, atrophy or necrosis of soft tissue, skin blanching, failure to relieve symptoms, worsening of symptoms, allergic reaction) of injection were discussed with the patient. Questions were addressed and answered.The patient elected to proceed. Written informed consent was obtained. The correct procedural site was identified and confirmed. A Right Knee intraarticular injection was performed using 1mL Depo Medrol 80mg per mL and 7mL (4mL 1% lidocaine, 3mL 0.25% marcaine)  of local anesthetic after sterile prep, to the correct procedural site. Sterile bandaid applied. This was tolerated well by the " patient. No apparent complications. Did also discuss that if diabetic, recommend close monitoring of blood sugars over the next week as cortisone injections can temporarily elevate blood sugars.    The information in this document, created by a scribe for me, accurately reflects the services I personally performed and the decisions made by me. I have reviewed and approved this document for accuracy.      Alcides Laurent M.D., M.S.  Dept. of Orthopaedic Surgery  [facility]                Alcides Laurent MD

## 2017-04-19 ENCOUNTER — TELEPHONE (OUTPATIENT)
Dept: ORTHOPEDICS | Facility: CLINIC | Age: 72
End: 2017-04-19

## 2017-04-19 NOTE — TELEPHONE ENCOUNTER
"Received a fax from [facility] requesting progress notes from office visit. I explained that we would need a RU signed prior to us sending any medical records. Samina said that it was not required because they are a skilled medical facility. I told her that it is a HIPPA violation if I do not get this, that I could not send any medical records without one. She said that they do not need one. I said well [facility] requires me to have a signed RU prior to me sending any medical records. She said, \"well fine then\" and hung up.   Tiffanie Ledezma Certified Medical Assistant    "

## 2017-04-28 ENCOUNTER — TELEPHONE (OUTPATIENT)
Dept: ORTHOPEDICS | Facility: CLINIC | Age: 72
End: 2017-04-28

## 2017-04-28 NOTE — TELEPHONE ENCOUNTER
Faxed office notes from 6/1/16 to present along with x-ray results and physical therapy orders as requested from [facility]. Fax to [phone]. Fax confirmed. Sent RU to abstracting.  Tiffanie Ledezma Certified Medical Assistant

## 2017-05-17 ENCOUNTER — TELEPHONE (OUTPATIENT)
Dept: ORTHOPEDICS | Facility: CLINIC | Age: 72
End: 2017-05-17

## 2017-05-17 ENCOUNTER — MEDICAL CORRESPONDENCE (OUTPATIENT)
Dept: HEALTH INFORMATION MANAGEMENT | Facility: CLINIC | Age: 72
End: 2017-05-17

## 2017-05-17 NOTE — TELEPHONE ENCOUNTER
Faxed back a signed plan/updated plan of progress to Moira Sousa at [phone]. Fax confirmed and sent to abstracting.  Tiffanie Ledezma Clinical Medical Assistant

## 2017-05-18 ENCOUNTER — TELEPHONE (OUTPATIENT)
Dept: ORTHOPEDICS | Facility: CLINIC | Age: 72
End: 2017-05-18

## 2017-05-18 NOTE — TELEPHONE ENCOUNTER
Called and left a vm for the patient to schedule an appointment with Dr. Laurent to discuss her concerns about her last injection on 4/17/17 and possibly get another injection. I left our number for scheduling.  Tiffanie Ledezma Certified Medical Assistant

## 2017-11-13 ENCOUNTER — OFFICE VISIT (OUTPATIENT)
Dept: ORTHOPEDICS | Facility: CLINIC | Age: 72
End: 2017-11-13
Payer: COMMERCIAL

## 2017-11-13 VITALS — WEIGHT: 223 LBS | HEIGHT: 68 IN | BODY MASS INDEX: 33.8 KG/M2 | RESPIRATION RATE: 16 BRPM

## 2017-11-13 DIAGNOSIS — M17.11 PRIMARY OSTEOARTHRITIS OF RIGHT KNEE: ICD-10-CM

## 2017-11-13 DIAGNOSIS — M25.511 ACUTE PAIN OF RIGHT SHOULDER: Primary | ICD-10-CM

## 2017-11-13 PROCEDURE — 99212 OFFICE O/P EST SF 10 MIN: CPT | Mod: 25 | Performed by: ORTHOPAEDIC SURGERY

## 2017-11-13 PROCEDURE — 20610 DRAIN/INJ JOINT/BURSA W/O US: CPT | Mod: 59 | Performed by: ORTHOPAEDIC SURGERY

## 2017-11-13 PROCEDURE — 20610 DRAIN/INJ JOINT/BURSA W/O US: CPT | Mod: RT | Performed by: ORTHOPAEDIC SURGERY

## 2017-11-13 RX ORDER — METHYLPREDNISOLONE ACETATE 80 MG/ML
80 INJECTION, SUSPENSION INTRA-ARTICULAR; INTRALESIONAL; INTRAMUSCULAR; SOFT TISSUE ONCE
Qty: 5 ML | Refills: 0 | OUTPATIENT
Start: 2017-11-13 | End: 2017-11-13

## 2017-11-13 RX ORDER — TRIAMCINOLONE ACETONIDE 40 MG/ML
40 INJECTION, SUSPENSION INTRA-ARTICULAR; INTRAMUSCULAR ONCE
Qty: 1 ML | Refills: 0 | OUTPATIENT
Start: 2017-11-13 | End: 2017-11-13

## 2017-11-13 ASSESSMENT — PAIN SCALES - GENERAL: PAINLEVEL: MILD PAIN (3)

## 2017-11-13 NOTE — MR AVS SNAPSHOT
After Visit Summary   11/13/2017    Kamila Strauss    MRN: [id number]           Patient Information     Date Of Birth          1945        Visit Information        Provider Department      11/13/2017 10:45 AM Alcides Laurent MD [facility]        Today's Diagnoses     Acute pain of right shoulder    -  1    Primary osteoarthritis of right knee          Care Instructions    Please remember to call and schedule a follow up appointment if one was recommended at your earliest convenience.  Orthopedics CLINIC HOURS TELEPHONE NUMBER   Dr. Anastasiia Moreno  Certified Medical Assistant   Monday & Wednesday   8am - 5pm  Thursday 1pm - 5pm  Friday 8am -11:30am Specialty schedulers:   [phone] to schedule your surgery.  Main Clinic:   [phone] to make an appointment with any provider.    Urgent Care locations:    [facility] Monday-Friday Closed  Saturday-Sunday 9am-5pm      Monday-Friday 12pm - 8pm  Saturday-Sunday 9am-5pm [phone]     If SURGERY has been recommended, please call our Specialty Schedulers at [phone] to schedule your procedure.    If you need a medication refill, please contact your pharmacy. Please allow 3 business days for your refill to be completed.    If an MRI or CT scan has been recommended, please call [facility] Schedulers at [phone] to schedule your appointment.  Use Brootat (secure e-mail communication and access to your chart) to send a message or to make an appointment. Please ask how you can sign up for NI.  Your care team's suggested websites for health information:   [email or web address] : Up to date and easily searchable information on multiple topics.   [email or web address] : [place] dept of heat, public health issues in [place], N1N1              Follow-ups after your visit        Follow-up notes from your care team     Return if symptoms worsen or fail  "to improve.      Who to contact     If you have questions or need follow up information about today's clinic visit or your schedule please contact [facility] directly at [phone].  Normal or non-critical lab and imaging results will be communicated to you by [employer], letter or phone within 4 business days after the clinic has received the results. If you do not hear from us within 7 days, please contact the clinic through [employer] or phone. If you have a critical or abnormal lab result, we will notify you by phone as soon as possible.  Submit refill requests through [employer] or call your pharmacy and they will forward the refill request to us. Please allow 3 business days for your refill to be completed.          Additional Information About Your Visit        [employer] Information     [employer] gives you secure access to your electronic health record. If you see a primary care provider, you can also send messages to your care team and make appointments. If you have questions, please call your primary care clinic.  If you do not have a primary care provider, please call [phone] and they will assist you.        Care EveryWhere ID     This is your Care EveryWhere ID. This could be used by other organizations to access your [place] medical records  [id number]        Your Vitals Were     Respirations Height BMI (Body Mass Index)             16 5' 8\" (1.727 m) 33.91 kg/m2          Blood Pressure from Last 3 Encounters:   11/03/14 140/90   09/03/14 130/82   08/25/14 133/77    Weight from Last 3 Encounters:   11/13/17 223 lb (101.2 kg)   04/17/17 210 lb (95.3 kg)   10/10/16 210 lb (95.3 kg)              We Performed the Following     DRAIN/INJECT LARGE JOINT/BURSA     METHYLPREDNISOLONE 80 MG INJ     TRIAMCINOLONE ACET INJ NOS          Today's Medication Changes          These changes are accurate as of: 11/13/17  1:28 PM.  If you have any questions, ask your nurse or doctor.          "      Start taking these medicines.        Dose/Directions    methylPREDNISolone acetate 80 MG/ML injection   Commonly known as:  DEPO-MEDROL   Used for:  Primary osteoarthritis of right knee   Started by:  Alcides Laurent MD        Dose:  80 mg   1 mL (80 mg) by INTRA-ARTICULAR route once for 1 dose   Quantity:  5 mL   Refills:  0       triamcinolone acetonide 40 MG/ML injection   Commonly known as:  KENALOG-40   Used for:  Acute pain of right shoulder   Started by:  Alcides Laurent MD        Dose:  40 mg   Inject 1 mL (40 mg) into the muscle once for 1 dose   Quantity:  1 mL   Refills:  0            Where to get your medicines      Some of these will need a paper prescription and others can be bought over the counter.  Ask your nurse if you have questions.     You don't need a prescription for these medications     methylPREDNISolone acetate 80 MG/ML injection    triamcinolone acetonide 40 MG/ML injection                Primary Care Provider Office Phone # Fax #    Ashley Julia Reed [phone]  [facility] [address]        Equal Access to Services     [facility]: Hadii aad ku hadasho Soomaali, waaxda luqadaha, qaybta kaalmada adeegyamaria teresa, nell rosas . So [facility] [phone].    ATENCIÓN: Si habla español, tiene a rodriguez disposición servicios gratuitos de asistencia lingüística. [facility] [phone].    We comply with applicable federal civil rights laws and [place] laws. We do not discriminate on the basis of race, color, national origin, age, disability, sex, sexual orientation, or gender identity.            Thank you!     Thank you for choosing [facility]  for your care. Our goal is always to provide you with excellent care. Hearing back from our patients is one way we can continue to improve our services. Please take a few minutes to complete the written survey that you may receive in  the mail after your visit with us. Thank you!             Your Updated Medication List - Protect others around you: Learn how to safely use, store and throw away your medicines at www.disposemymeds.org.          This list is accurate as of: 11/13/17  1:28 PM.  Always use your most recent med list.                   Brand Name Dispense Instructions for use Diagnosis    acetaminophen 500 MG tablet    TYLENOL     Take 2-3 tablets by mouth every 6 hours as needed. 4 tabs qhs prn        aspirin 81 MG tablet      1 TABLET DAILY*        CALCIUM + D PO      1 TABLET DAILY        calcium carbonate 600 MG tablet    OS-MIKE 600 mg Sokaogon. Ca     Take 1,000 mg by mouth daily        * carbidopa-levodopa  MG per CR tablet    SINEMET CR    90 tablet    Take 1 tablet by mouth At Bedtime    Parkinsonism (H)       * carbidopa-levodopa  MG per tablet    SINEMET    180 tablet    Take 2 tablets by mouth 3 times daily Taking 7am,11am,4pm    Parkinsonism (H)       cholecalciferol 1000 UNIT tablet    vitamin D3    30 tablet    1000 units daily        FISH OIL      3 tablets daily.        fluocinonide 0.05 % cream    LIDEX    45 g    Apply  topically 2 times daily.    Contact dermatitis       furosemide 20 MG tablet    LASIX     Take 20 mg by mouth        HYDROcodone-acetaminophen 5-325 MG per tablet    NORCO     Take 1 tablet by mouth every 6 hours as needed for moderate to severe pain        hydrocortisone valerate 0.2 % ointment    WEST-SIENNA    60 g    Apply topically 2 times daily Use up to two weeks then on an as needed basis to affected areas on face.    Dermatitis, seborrheic       lamoTRIgine 100 MG tablet    LaMICtal     Take 200 mg by mouth 2 times daily        * levETIRAcetam 750 MG tablet    KEPPRA     Take 750 mg by mouth 2 times daily.        * levETIRAcetam 500 MG tablet    KEPPRA     Take 500 mg by mouth 2 times daily.        levothyroxine 112 MCG tablet    SYNTHROID/LEVOTHROID    90 tablet    Take 1 tablet (112  mcg) by mouth daily    Hypothyroidism       MAGNESIUM PO      1 TABLET DAILY        methylPREDNISolone acetate 80 MG/ML injection    DEPO-MEDROL    5 mL    1 mL (80 mg) by INTRA-ARTICULAR route once for 1 dose    Primary osteoarthritis of right knee       order for DME     1 each    Equipment being ordered: Commode seat lift without handles.    Status post right hip replacement, Falls frequently, Vestibular neuropathy       order for DME     1 Device    Equipment being ordered: Wheelchair    Parkinsonism (H), Risk for falls, Brain tumor (H)       oxyCODONE IR 5 MG tablet    ROXICODONE          polyethylene glycol Packet    MIRALAX/GLYCOLAX     Take 17 g by mouth daily as needed        senna-docusate 8.6-50 MG per tablet    SENOKOT-S;PERICOLACE     Take 2 tablets by mouth daily as needed        sertraline 100 MG tablet    ZOLOFT    135 tablet    Take 1.5 tablets (150 mg) by mouth daily    Major depressive disorder, recurrent episode, unspecified       TRENTAL 400 MG CR tablet   Generic drug:  pentoxifylline      Take 400 mg by mouth 3 times daily (with meals)        triamcinolone acetonide 40 MG/ML injection    KENALOG-40    1 mL    Inject 1 mL (40 mg) into the muscle once for 1 dose    Acute pain of right shoulder       UNABLE TO FIND      MEDICATION NAME: Uristat (phenazopyridine) 190 mg oral, three times a day PRN        VIMPAT 50 MG Tabs tablet   Generic drug:  lacosamide     60 tablet    50 mg daily DOSE UNKNOWN; 1 tab in the morning and 1 tab in the evening        vitamin E 400 UNIT capsule     30 capsule    Take 1 capsule (400 Units) by mouth daily        ZOFRAN PO      Take 8 mg by mouth        * Notice:  This list has 4 medication(s) that are the same as other medications prescribed for you. Read the directions carefully, and ask your doctor or other care provider to review them with you.

## 2017-11-13 NOTE — PATIENT INSTRUCTIONS
Please remember to call and schedule a follow up appointment if one was recommended at your earliest convenience.  Orthopedics CLINIC HOURS TELEPHONE NUMBER   Dr. Anastasiia Moreno  Certified Medical Assistant   Monday & Wednesday   8am - 5pm  Thursday 1pm - 5pm  Friday 8am -11:30am Specialty schedulers:   [phone] to schedule your surgery.  Main Clinic:   [phone] to make an appointment with any provider.    Urgent Care locations:    [facility] Monday-Friday Closed  Saturday-Sunday 9am-5pm      Monday-Friday 12pm - 8pm  Saturday-Sunday 9am-5pm [phone]     If SURGERY has been recommended, please call our Specialty Schedulers at [phone] to schedule your procedure.    If you need a medication refill, please contact your pharmacy. Please allow 3 business days for your refill to be completed.    If an MRI or CT scan has been recommended, please call [facility] Schedulers at [phone] to schedule your appointment.  Use [email or web address] (secure e-mail communication and access to your chart) to send a message or to make an appointment. Please ask how you can sign up for [email or web address].  Your care team's suggested websites for health information:   [email or web address] : Up to date and easily searchable information on multiple topics.   [email or web address] : [place] dept of heat, public health issues in [place], N1N1

## 2017-11-13 NOTE — NURSING NOTE
"Chief Complaint   Patient presents with     RECHECK     Right knee and shoulder pain. Last injection in right knee was 4/17/17. Injection didn't work very well. She continues to have pain. She has terrible anterior thigh muscle pain. She was seen by a chiropractor and was told they thought it was coming from the right hip. She is having right shoulder pain, slowly getting worse. She has been involved in an exercise program at her home. Pain is in the lateral upper arm. No treatments.        Initial Resp 16  Ht 1.727 m (5' 8\")  Wt 101.2 kg (223 lb)  BMI 33.91 kg/m2 Estimated body mass index is 33.91 kg/(m^2) as calculated from the following:    Height as of this encounter: 1.727 m (5' 8\").    Weight as of this encounter: 101.2 kg (223 lb).  Medication Reconciliation: complete   Tiffanie Ledezma Certified Medical Assistant    "

## 2017-11-13 NOTE — PROGRESS NOTES
The patient's right shoulder was prepped with betadine solution after verification of allergies. Area approximately 10 cm x 10 cm prepped in a sterile fashion. After injection, betadine removed with soap and water and band-aids applied.    4cc Lidocaine 1%  NDC 7991-9956-84, LOT -DK,  [id number]  3cc Bupivacaine 0.25% NDC 9366-6798-70, LOT -DK,  2018  2cc Kenalog 40 ND 9082-7477-49, LOT [id number],  [id number]  injected into patient's right shoulder by Dr.Troy Laurent     The patient's right knee was prepped with betadine solution after verification of allergies. Area approximately 10 cm x 10 cm prepped in a sterile fashion. After injection, betadine removed with soap and water and band-aids applied.    4cc Lidocaine 1%  NDC 7128-8708-73, LOT -DK,  [id number]  3cc Bupivacaine 0.25% NDC 1610-6144-10, LOT -DK,  2018  1cc Depo Medrol ND 3099-7332-18, LOT [id number],  2020  injected into patient's right knee by Dr.Troy Laurent

## 2017-11-13 NOTE — PROGRESS NOTES
CHIEF COMPLAINT:   Chief Complaint   Patient presents with     RECHECK     Right knee and shoulder pain. Last injection in right knee was 4/17/17. Injection didn't work very well. She continues to have pain. She has terrible anterior thigh muscle pain. She was seen by a chiropractor and was told they thought it was coming from the right hip. She is having right shoulder pain, slowly getting worse. She has been involved in an exercise program at her home. Pain is in the lateral upper arm. No treatments.        HISTORY OF PRESENT ILLNESS    Kamila Strauss is a 72 year old female seen for evaluation of ongoing right knee pain with no known injury.   Pain has been present for 1+ year. Pain today is rated 5/10. Her last injection was on 4/17/2017. This injection did provide much relief. She continues to have pain today but not as bad. She is having trouble walking. Previously, the left leg was not moving correctly, due to Parkinson's and brain tumor. She is now having trouble walking with the right leg. Was initially though to have started in the back. She was seen by a chiropractor and was told her thigh cramping was coming from the hip and not the low back. She has a very severe muscle cramp in the thigh. She is using a wheelchair again today and doesn't ambulate much other than up to 10 steps with Physical Therapy.     She also presents today with right shoulder pain. Pain is mild, rated a 3/10. Her pain is worsening. She has been involved in a rigorous exercise program, and feels that she may have overdid it. Her pain is located over the lateral arm. She has not had any treatments. Prior left shoulder pain alleviated with cortisone injections.    Presents with her  randi.       Present symptoms: mild pain.  Anterior/medial knee, thigh.  Pain severity: 3/10  Frequency of symptoms: frequently  Exacerbating Factors: with doing exercises  Relieving Factors: rest  Night Pain: No  Pain while at rest: No    Numbness or tingling: No   Patient has tried:     NSAIDS: No      Physical Therapy: Yes      Activity modification: No      Bracing: No      Injections: yes, right knee and left shoulder, none for right shoulder.     Ice: No      Assistive device:  No     Other: none    Orthopaedic PMH: previous history of left knee pain/ osteoarthritis with injections. Prior right total hip arthroplasty years ago done elsewhere.    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); Ataxia (9/15/2013); Back pain; Basal cell carcinoma of anterior chest (8/25/2014); Cataract; Chronic constipation; Chronic low back pain (9/16/2013); Colon polyp; Colon polyp; Deep venous thrombosis (H) (9/16/2013); Diverticulosis; Former smoker (9/16/2013); Gait disorder (8/22/2014); Hip replacement; Hyperlipidemia LDL goal < 130; Hypothyroid; Inflammatory arthritis; Localization-related (focal) (partial) epilepsy and epileptic syndromes with complex partial seizures, with intractable epilepsy; Localization-related (focal) (partial) epilepsy and epileptic syndromes with complex partial seizures, without mention of intractable epilepsy; Major depressive disorder, recurrent episode, unspecified; Malignant neoplasm of cerebrum, except lobes and ventricles (H); Osteoporosis (7/8/2013); Other convulsions; and Seizure disorder (H). She also has no past medical history of Amblyopia; Bleeding disorder (H); Diabetes mellitus (H); Diabetic retinopathy (H); Glaucoma (increased eye pressure); Heart disease; Hypertension; Kidney disease; Retinal detachment; Senile macular degeneration; Strabismus; Stroke (H); Surgical complications; Type II or unspecified type diabetes mellitus without mention of complication, not stated as uncontrolled; Unspecified asthma(493.90); or Uveitis.  Patient Active Problem List    Diagnosis Date Noted     Meibomian gland dysfunction 01/13/2015     Priority: [facility]  (Active) 10/23/2014     Priority: Medium       Care Coordinator:  Rekha Don    See Letters for [facility] Care Plan  Date:  October 23, 2014             Basal cell carcinoma of anterior chest 08/25/2014     Priority: Medium     Abnormal brain MRI 08/25/2014     Priority: Medium     Indication:  Brain tumor, followup.  Astrocytoma.    Comparison:  04/29/2014.    Technique:  Multiplanar T1-T2, FLAIR and diffusion-weighted imaging.  Post gadolinium T1 sequences.    Findings:  Compared to the prior exam, there is a stable mass within the right posterior frontal lobe at the vertex measuring approximately 3.1 x 3.8 cm in transverse and AP dimensions with predominantly low T1 and high T2 signal intensity.  There are internal areas of T2 shortening which may represent degraded blood products or calcification.  Post-contrast imaging again demonstrates heterogeneous enhancement.  There is stable mild T2 prolongation within the white matter inferior to the mass consistent with edema.    Otherwise, moderate generalized cerebral volume loss.  Patchy areas of T2/FLAIR signal within the periventricular and subcortical white matter which likely represent small vessel ischemic changes.  No acute intracranial hemorrhage.  No abnormal ventricular dilatation.  Intracranial vascular flow voids are preserved.  No mass or mass effect.  No midline shift.  No restricted diffusion to suggest acute ischemia.  No new areas of abnormal enhancement or enhancing lesions.  Bilateral orbits are unremarkable.  Normal appearing sella.  Fluid and mucosal thickening nearly completely opacifies the paranasal sinuses.  The mastoid air cells are clear.    Impression:   1. No interval change.   2. Stable size and appearance of the heterogeneous enhancing tumor in the posterior right frontal lobe at the vertex.  No evidence of progression of tumor.   3. Moderate generalized cerebral volume loss.  Chronic deep white matter small vessel ischemic changes.   4.  No acute intracranial abnormality.   5. Sinusitis.    Dictated by Denzel Jacinto MD @ Aug 14 2014 10:08AM    Signed by Dr. Denzel Jacinto @ Aug 14 2014 10:14AM         Gait disorder 08/22/2014     Priority: Medium     Impingement syndrome of right shoulder 10/07/2013     Priority: Medium     Menopause 09/16/2013     Priority: Medium     Vaginal bleeding 7/15/2002 s/p d and c       Malaria 09/16/2013     Priority: Medium     1997 after returning from [place]       Former smoker 09/16/2013     Priority: Medium     Rotator cuff injury 09/16/2013     Priority: Medium     right       Deep venous thrombosis (H) 09/16/2013     Priority: Medium     Chronic low back pain 09/16/2013     Priority: Medium     Pelvic fracture (H) 09/16/2013     Priority: Medium     10/16/2010       Astrocytoma (H) 09/15/2013     Priority: Medium     Managed by Dr Suraj Camara MD [facility].       Ataxia 09/15/2013     Priority: Medium     Closed fracture of condyle of humerus 07/25/2013     Priority: Medium     Closed fracture lateral condyle humerus 07/22/2013     Priority: Medium     Accidental fall on or from stairs or steps 07/08/2013     Priority: Medium     Osteoporosis 07/08/2013     Priority: Medium     Vertebral fracture L3 post fall 8/28/2012 09/04/2012     Priority: Medium     Dr Ha in [facility].         Bradycardia, chronic 09/04/2012     Priority: Medium     Asymptomatic  No AV block on ECG  Normal QT interval       Vestibular neuropathy 09/04/2012     Priority: Medium     H/o lower extremity weakness. Chronic use of a walker due to imbalance  rxd with meclizine in 1997       Seizure disorder (H) 09/04/2012     Priority: Medium     History of bilat pulmonary embolism 2010 09/04/2012     Priority: Medium     Previously on coumadin but was discontinued and asa started  Fall risk       Encounter for counseling 08/29/2012     Priority: Medium     Overview:   Patient has identified Health Care Agent(s): Yes  Add  Health Care Agents: Yes  Spouse Shmuel.  Health Care Agent(s):  Primary Health Care Agent:  Relationship:  Phone:    Secondary Health Care Agent:  Relationship:  Phone:    Conservator:  Relationship:  Phone:    Guardian: Relationship:  Phone:      Patient has Advance Care Plan Documents (Health Care Directive, POLST):     Patient has identified Specific Treatment Preferences: Full       Depressive disorder 08/29/2012     Priority: Medium     Fall 08/29/2012     Priority: Medium     Closed fracture of lumbar vertebra (H) 08/29/2012     Priority: Medium     Counseling for marital and partner problems 05/09/2012     Priority: Medium     Problem list name updated by automated process. Provider to review       Partial epilepsy with impairment of consciousness (H) 04/23/2012     Priority: Medium     Problem list name updated by automated process. Provider to review       Falls frequently 04/23/2012     Priority: Medium     Status post right hip replacement 1987 and 1993 10/21/2011     Priority: Medium     Cataract 09/12/2011     Priority: Medium     Utility update for deleted IMO code  [employer] Update utility       Hypothyroidism 04/18/2011     Priority: Medium     Advanced directives, counseling/discussion 04/18/2011     Priority: Medium     Patient has completed an Advance/Health Care Directive (HCD), to bring in copy to be scanned into [employer].    Padmini Nichole  April 18, 2011    Reviewed POLST  Signed by provider on 09/28/2012.    Raven Bermudez CMA    Health Care Directive received, reviewed for clarity and legality, and will be scanned into the patient's EMR chart.  Raven Bermudez CMA  ACP Facilitator             Constipation 04/18/2011     Priority: Medium     Major depressive disorder, recurrent episode (H)      Priority: Medium     Problem list name updated by automated process. Provider to review       Malignant Neoplasm of Cerebrum, 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      Priority: Medium     Grade  II astrocytoma diagnosed 1987; s/p biopsy but no resection and 30 XRT  Had left focal sz sept 1997 and focal sz 10/21/2011  Mri brain 9/97, 9/98,. 4/00, 1/30/03 at [place] [facility] 2005 shows no change. But recent jan 2013 showed  A change and dr. Camara had done scan July 2013 and another is due in late September or early October 2013.   Last seen neurosurgery 12/97  Followed by dr. Suraj camara 2013       Hyperlipidemia with target LDL less than 130      Priority: Medium     Diagnosis updated by automated process. Provider to review and confirm.       Pulmonary embolism and infarction (H) 09/30/2009     Priority: Medium     Epilepsy (H) 09/30/2009     Priority: Medium       Surgical Hx:  has a past surgical history that includes COLONOSCOPY THRU STOMA W BIOPSY/CAUTERY TUMOR/POLYP/LESION; RECONSTRUC HIP SOCKET; TOTAL KNEE ARTHROPLASTY; and biopsy (8/04/15).    Medications:   Current Outpatient Prescriptions:      furosemide (LASIX) 20 MG tablet, Take 20 mg by mouth, Disp: , Rfl:      oxyCODONE (ROXICODONE) 5 MG IR tablet, , Disp: , Rfl:      HYDROcodone-acetaminophen (NORCO) 5-325 MG per tablet, Take 1 tablet by mouth every 6 hours as needed for moderate to severe pain, Disp: , Rfl:      carbidopa-levodopa (SINEMET CR)  MG per tablet, Take 1 tablet by mouth At Bedtime, Disp: 90 tablet, Rfl: 1     carbidopa-levodopa (SINEMET)  MG per tablet, Take 2 tablets by mouth 3 times daily Taking 7am,11am,4pm, Disp: 180 tablet, Rfl: 5     sertraline (ZOLOFT) 100 MG tablet, Take 1.5 tablets (150 mg) by mouth daily, Disp: 135 tablet, Rfl: 3     vitamin E 400 UNIT capsule, Take 1 capsule (400 Units) by mouth daily, Disp: 30 capsule, Rfl:      cholecalciferol (VITAMIN D) 1000 UNIT tablet, 1000 units daily, Disp: 30 tablet, Rfl:      senna-docusate (SENOKOT-S;PERICOLACE) 8.6-50 MG per tablet, Take 2 tablets by mouth daily as needed, Disp: , Rfl:      polyethylene glycol (MIRALAX/GLYCOLAX) packet, Take 17 g by mouth  daily as needed, Disp: , Rfl:      ORDER FOR DME, Equipment being ordered: Wheelchair, Disp: 1 Device, Rfl: 1     hydrocortisone valerate (WEST-SIENNA) 0.2 % ointment, Apply topically 2 times daily Use up to two weeks then on an as needed basis to affected areas on face., Disp: 60 g, Rfl: 0     levothyroxine (SYNTHROID, LEVOTHROID) 112 MCG tablet, Take 1 tablet (112 mcg) by mouth daily, Disp: 90 tablet, Rfl: 1     pentoxifylline (TRENTAL) 400 MG CR tablet, Take 400 mg by mouth 3 times daily (with meals), Disp: , Rfl:      lamoTRIgine (LAMICTAL) 100 MG tablet, Take 200 mg by mouth 2 times daily, Disp: , Rfl:      ORDER FOR DME, Equipment being ordered: Commode seat lift without handles., Disp: 1 each, Rfl: 0     levetiracetam (KEPPRA) 750 MG tablet, Take 750 mg by mouth 2 times daily., Disp: , Rfl:      levetiracetam (KEPPRA) 500 MG tablet, Take 500 mg by mouth 2 times daily., Disp: , Rfl:      acetaminophen (TYLENOL) 500 MG tablet, Take 2-3 tablets by mouth every 6 hours as needed. 4 tabs qhs prn , Disp: , Rfl:      FISH OIL, 3 tablets daily., Disp: , Rfl:      ASPIRIN 81 MG OR TABS, 1 TABLET DAILY*, Disp: , Rfl:      Ondansetron HCl (ZOFRAN PO), Take 8 mg by mouth, Disp: , Rfl:      UNABLE TO FIND, MEDICATION NAME: Uristat (phenazopyridine) 190 mg oral, three times a day PRN, Disp: , Rfl:      fluocinonide (LIDEX) 0.05 % cream, Apply  topically 2 times daily., Disp: 45 g, Rfl: 0     calcium carbonate (OS-MIKE 600 MG [place]) 600 MG TABS tablet, Take 1,000 mg by mouth daily, Disp: , Rfl:      lacosamide (VIMPAT) 50 MG TABS, 50 mg daily DOSE UNKNOWN; 1 tab in the morning and 1 tab in the evening, Disp: 60 tablet, Rfl: 11     CALCIUM + D OR, 1 TABLET DAILY, Disp: , Rfl:      MAGNESIUM OR, 1 TABLET DAILY, Disp: , Rfl:     Allergies:   Allergies   Allergen Reactions     Nickel Rash     Simvastatin [Hmg-Coa-R Inhibitors] Other (See Comments)     Muscle aches       Social Hx: patient. .   reports that she quit  "smoking about 8 years ago. She has never used smokeless tobacco. She reports that she drinks alcohol. She reports that she does not use illicit drugs.    Family Hx: family history includes CANCER in her father; Family History Negative in an other family member; Thyroid Disease in her mother. There is no history of DIABETES, Hypertension, CEREBROVASCULAR DISEASE, Glaucoma, or Macular Degeneration.       REVIEW OF SYSTEMS:  CONSTITUTIONAL:NEGATIVE for fever, chills, change in weight  INTEGUMENTARY/SKIN: NEGATIVE for worrisome rashes, moles or lesions  MUSCULOSKELETAL:See HPI above  NEURO: NEGATIVE for weakness, dizziness or paresthesias    This document serves as a record of the services and decisions personally performed and made by Alcides Laurent MD. It was created on his behalf by Jenny Ornelas, a trained medical scribe. The creation of this document is based the provider's statements to the medical scribe.    Scribe Jenny Ornelas 11:10 AM 11/13/2017       PHYSICAL EXAM:  Resp 16  Ht 1.727 m (5' 8\")  Wt 101.2 kg (223 lb)  BMI 33.91 kg/m2   GENERAL APPEARANCE: healthy, alert, no distress  SKIN: no suspicious lesions or rashes  NEURO: decreased  strength and tone, mentation intact and speech normal  PSYCH:  mentation appears normal and affect normal, not anxious  RESPIRATORY: No increased work of breathing.      RIGHT UPPER EXTREMITY:  Sensation intact to light touch in median, radial, ulnar and axillary nerve distributions  Palpable 2+ radial pulse, brisk capillary refill to all fingers, wwp  Intact epl fpl fdp edc wrist flexion/extension biceps triceps deltoid    RIGHT SHOULDER:  Shoulder Inspection: no swelling, bruising, discoloration, or obvious deformity or asymmetry  Tender: nontender to palpation   Range of Motion:   Active: forward flexion 155 degrees, (170 left), external rotation 30 degrees (bilaterally)  Strength: forward flexion 4+/5, External rotation 4+/5  Impingement: all grade 2 " positive        BILATERAL LOWER EXTREMITIES:  Gait: in wheelchair.  No gross deformities or masses.  Bilateral Quad atrophy, strength weak  Intact sensation deep peroneal nerve, superficial peroneal nerve, med/lat tibial nerve, sural nerve, saphenous nerve  Intact EHL, EDL, TA, FHL, GS, quadriceps hamstrings and hip flexors  Toes warm and well perfused, brisk capillary refill. Palpable 2+ dp pulses.  Bilateral calf soft and nttp or squeeze.  Edema: none      LEFT KNEE EXAM:    Skin: intact, no ecchymosis or erythema  ROM: 5 degrees extension to 115 degrees flexion  Tight hamstrings on straight leg raise.  Effusion: trace  Tender: diffuse med/lat joint line, anterior and posterior knee  McMurrays: negative     MCL: stable, and non-painful at both 0 and 30 degrees knee flexion  Varus stress: stable, and non-painful at both 0 and 30 degrees knee flexion  Lachmans: neg, firm endpoint  Posterior Drawer stable  Patellofemoral joint:                Apprehension: negative              Crepitations: positive   Grind: positive.    RIGHT KNEE EXAM:    Skin: intact, no ecchymosis or erythema  ROM: 0 extension to 120 flexion  Tight hamstrings on straight leg raise.  Effusion: none  Tender: medial joint line     MCL: stable, and non-painful at both 0 and 30 degrees knee flexion  Varus stress: stable, and non-painful at both 0 and 30 degrees knee flexion  Lachmans: neg, firm endpoint  Posterior Drawer stable  Patellofemoral joint:                Apprehension: negative              Crepitations: mild   Grind: positive.      X-RAY:  3 views right knee from 4/17/2017 were reviewed in clinic today. On my review, no obvious fractures or dislocations. There is moderate tricompartmental degenerative changes with marginal osteophytes, subchondral sclerosis.    Impression:   72 year old female with   1. Right knee pain, primary osteoarthritis.  2. Right shoulder pain, shoulder impingement syndrome and rotator cuff tendonitis.    Plan:  "  KNEE:  Non-surgical treatment for knee arthritis includes:    * rest, sitting  * Activity modification - avoid impact activities or activities that aggravate symptoms.  * NSAIDS (non-steroidal anti-inflammatory medications; e.g. Aleve, advil, motrin, ibuprofen) - regular use for inflammation ( twice daily or three times daily), with food, as long as no contra-indications Please discuss with primary care doctor if needed  * ice, 15-20 minutes at a time several times a day or as needed.  * Strengthening of quadriceps muscles  * Physical Therapy for strengthening, stretching and range of motion exercises of legs  * Tylenol as needed for pain, consider Tylenol arthritis or similar  * Weight loss: Weight loss:  Body mass index is 33.91 kg/(m^2).. weight loss benefits, not only for the current pain symptoms, but also overall health. Recommend a good diet plan that works for the patient, with the assistance of a dietician or primary care doctor as needed. Also, a good, low-impact exercise program for at least 20 minutes per day, 3 times per week, such as exercise bike, elliptical , or pool.  * Exercise: low impact such as stationary bike, elliptical, pool.  * Injections: cortisone versus viscosupplementation (hyaluronic acid, \"rooster comb\", \"gel shots\"); risks and perceived benefits discussed today. Patient elects to proceed.  * Bracing: bracing the knee may offer some relief of symptoms when worn and provide some stability.  * over the counter supplements such as glucosamine and chondroitin sulfate may help with joint pain.  * topical ointments may help as well    * return to clinic as needed.    SHOULDER:  * discussed clinical exam findings. Consistent with shoulder impingement syndrome and rotator cuff  tendonitis.    Treatment:    * Rest  * Activity modification - avoid activities that aggravate symptoms or started symptoms at onset.  * NSAIDS - regular use for inflammation, with food, as long as no " contra-indications. Please discuss with pcp if needed.  * Ice twice daily to three times daily, 15-20 minutes at a time  * heat may be beneficial prior to exercising  * Physical Therapy for strengthening, stretching and range of motion exercises of rotator cuff and periscapular stabilization.  * Tylenol as needed for pain  * Injections: cortisone injections may be beneficial to help decrease swelling and inflammation within the shoulder or bursa, and decrease pain. With decreased pain, Physical Therapy and exercises will be more effective and efficient. Patient elected to proceed.  * Return to clinic as needed  * consider MRI of the shoulder in future if symptoms persist despite the above regimen of treatment.      PROCEDURE NOTE:  The risks, perceived benefits and potential complications (including but not limited to: bleeding, infection, pain, scar, damage to adjacent structures, atrophy or necrosis of soft tissue, skin blanching, failure to relieve symptoms, worsening of symptoms, allergic reaction) of injection were discussed with the patient. Questions were addressed and answered.The patient elected to proceed. Written informed consent was obtained. The correct procedural site was identified and confirmed. A Right Knee intraarticular injection was performed using 1mL Depo Medrol 80mg per mL and 7mL (4mL 1% lidocaine, 3mL 0.25% marcaine)  of local anesthetic after sterile prep, to the correct procedural site. Sterile bandaid applied. This was tolerated well by the patient. No apparent complications. Did also discuss that if diabetic, recommend close monitoring of blood sugars over the next week as cortisone injections can temporarily elevate blood sugars.    PROCEDURE NOTE:  The risks, perceived benefits and potential complications (including but not limited to: bleeding, infection, pain, scar, damage to adjacent structures, atrophy or necrosis of soft tissue, skin blanching, failure to relieve symptoms,  worsening of symptoms, allergic reaction) of injection were discussed with the patient. Questions were addressed and answered.The patient elected to proceed. Written informed consent was obtained. The correct procedural site was identified and confirmed. A Right Shoulder subacromial injection was performed using 2mL Kenalog-40 40mg per mL and 7mL (4mL 1% lidocaine, 3mL 0.25% marcaine)  of local anesthetic after sterile prep, to the correct procedural site. Sterile bandaid applied. This was tolerated well by the patient. No apparent complications. Did also discuss that if diabetic, recommend close monitoring of blood sugars over the next week as cortisone injections can temporarily elevate blood sugars.       The information in this document, created by a scribe for me, accurately reflects the services I personally performed and the decisions made by me. I have reviewed and approved this document for accuracy.      Alcides Laurent M.D., M.S.  Dept. of Orthopaedic Surgery  [facility]

## 2017-11-13 NOTE — LETTER
11/13/2017         RE: Kamila Strauss  [address] [place] [address]        Dear Colleague,    Thank you for referring your patient, Kamila Strauss, to the [facility] [place]. Please see a copy of my visit note below.    CHIEF COMPLAINT:   Chief Complaint   Patient presents with     RECHECK     Right knee and shoulder pain. Last injection in right knee was 4/17/17. Injection didn't work very well. She continues to have pain. She has terrible anterior thigh muscle pain. She was seen by a chiropractor and was told they thought it was coming from the right hip. She is having right shoulder pain, slowly getting worse. She has been involved in an exercise program at her home. Pain is in the lateral upper arm. No treatments.        HISTORY OF PRESENT ILLNESS    Kamila Strauss is a 72 year old female seen for evaluation of ongoing right knee pain with no known injury.   Pain has been present for 1+ year. Pain today is rated 5/10. Her last injection was on 4/17/2017. This injection did provide much relief. She continues to have pain today but not as bad. She is having trouble walking. Previously, the left leg was not moving correctly, due to Parkinson's and brain tumor. She is now having trouble walking with the right leg. Was initially though to have started in the back. She was seen by a chiropractor and was told her thigh cramping was coming from the hip and not the low back. She has a very severe muscle cramp in the thigh. She is using a wheelchair again today and doesn't ambulate much other than up to 10 steps with Physical Therapy.     She also presents today with right shoulder pain. Pain is mild, rated a 3/10. Her pain is worsening. She has been involved in a rigorous exercise program, and feels that she may have overdid it. Her pain is located over the lateral arm. She has not had any treatments. Prior left shoulder pain alleviated with cortisone injections.    Presents with  her  randi.       Present symptoms: mild pain.  Anterior/medial knee, thigh.  Pain severity: 3/10  Frequency of symptoms: frequently  Exacerbating Factors: with doing exercises  Relieving Factors: rest  Night Pain: No  Pain while at rest: No   Numbness or tingling: No   Patient has tried:     NSAIDS: No      Physical Therapy: Yes      Activity modification: No      Bracing: No      Injections: yes, right knee and left shoulder, none for right shoulder.     Ice: No      Assistive device:  No     Other: none    Orthopaedic PMH: previous history of left knee pain/ osteoarthritis with injections. Prior right total hip arthroplasty years ago done elsewhere.    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); Ataxia (9/15/2013); Back pain; Basal cell carcinoma of anterior chest (8/25/2014); Cataract; Chronic constipation; Chronic low back pain (9/16/2013); Colon polyp; Colon polyp; Deep venous thrombosis (H) (9/16/2013); Diverticulosis; Former smoker (9/16/2013); Gait disorder (8/22/2014); Hip replacement; Hyperlipidemia LDL goal < 130; Hypothyroid; Inflammatory arthritis; Localization-related (focal) (partial) epilepsy and epileptic syndromes with complex partial seizures, with intractable epilepsy; Localization-related (focal) (partial) epilepsy and epileptic syndromes with complex partial seizures, without mention of intractable epilepsy; Major depressive disorder, recurrent episode, unspecified; Malignant neoplasm of cerebrum, except lobes and ventricles (H); Osteoporosis (7/8/2013); Other convulsions; and Seizure disorder (H). She also has no past medical history of Amblyopia; Bleeding disorder (H); Diabetes mellitus (H); Diabetic retinopathy (H); Glaucoma (increased eye pressure); Heart disease; Hypertension; Kidney disease; Retinal detachment; Senile macular degeneration; Strabismus; Stroke (H); Surgical complications; Type II or unspecified type diabetes  mellitus without mention of complication, not stated as uncontrolled; Unspecified asthma(493.90); or Uveitis.  Patient Active Problem List    Diagnosis Date Noted     Meibomian gland dysfunction 01/13/2015     Priority: Medium     Health Care Home (Active) 10/23/2014     Priority: Medium       Care Coordinator:  Rekha Don    See Letters for [facility] Care Plan  Date:  October 23, 2014             Basal cell carcinoma of anterior chest 08/25/2014     Priority: Medium     Abnormal brain MRI 08/25/2014     Priority: Medium     Indication:  Brain tumor, followup.  Astrocytoma.    Comparison:  04/29/2014.    Technique:  Multiplanar T1-T2, FLAIR and diffusion-weighted imaging.  Post gadolinium T1 sequences.    Findings:  Compared to the prior exam, there is a stable mass within the right posterior frontal lobe at the vertex measuring approximately 3.1 x 3.8 cm in transverse and AP dimensions with predominantly low T1 and high T2 signal intensity.  There are internal areas of T2 shortening which may represent degraded blood products or calcification.  Post-contrast imaging again demonstrates heterogeneous enhancement.  There is stable mild T2 prolongation within the white matter inferior to the mass consistent with edema.    Otherwise, moderate generalized cerebral volume loss.  Patchy areas of T2/FLAIR signal within the periventricular and subcortical white matter which likely represent small vessel ischemic changes.  No acute intracranial hemorrhage.  No abnormal ventricular dilatation.  Intracranial vascular flow voids are preserved.  No mass or mass effect.  No midline shift.  No restricted diffusion to suggest acute ischemia.  No new areas of abnormal enhancement or enhancing lesions.  Bilateral orbits are unremarkable.  Normal appearing sella.  Fluid and mucosal thickening nearly completely opacifies the paranasal sinuses.  The mastoid air cells are clear.    Impression:   1. No interval change.   2. Stable size and  appearance of the heterogeneous enhancing tumor in the posterior right frontal lobe at the vertex.  No evidence of progression of tumor.   3. Moderate generalized cerebral volume loss.  Chronic deep white matter small vessel ischemic changes.   4. No acute intracranial abnormality.   5. Sinusitis.    Dictated by Denzel Jacinto MD @ Aug 14 2014 10:08AM    Signed by Dr. Denzel Jacinto @ Aug 14 2014 10:14AM         Gait disorder 08/22/2014     Priority: Medium     Impingement syndrome of right shoulder 10/07/2013     Priority: Medium     Menopause 09/16/2013     Priority: Medium     Vaginal bleeding 7/15/2002 s/p d and c       Malaria 09/16/2013     Priority: Medium     1997 after returning from [place]       Former smoker 09/16/2013     Priority: Medium     Rotator cuff injury 09/16/2013     Priority: Medium     right       Deep venous thrombosis (H) 09/16/2013     Priority: Medium     Chronic low back pain 09/16/2013     Priority: Medium     Pelvic fracture (H) 09/16/2013     Priority: Medium     10/16/2010       Astrocytoma (H) 09/15/2013     Priority: Medium     Managed by Dr Suraj Camara MD [facility].       Ataxia 09/15/2013     Priority: Medium     Closed fracture of condyle of humerus 07/25/2013     Priority: Medium     Closed fracture lateral condyle humerus 07/22/2013     Priority: Medium     Accidental fall on or from stairs or steps 07/08/2013     Priority: Medium     Osteoporosis 07/08/2013     Priority: Medium     Vertebral fracture L3 post fall 8/28/2012 09/04/2012     Priority: Medium     Dr Ha in [facility].         Bradycardia, chronic 09/04/2012     Priority: Medium     Asymptomatic  No AV block on ECG  Normal QT interval       Vestibular neuropathy 09/04/2012     Priority: Medium     H/o lower extremity weakness. Chronic use of a walker due to imbalance  rxd with meclizine in 1997       Seizure disorder (H) 09/04/2012     Priority: Medium     History of bilat pulmonary embolism  2010 09/04/2012     Priority: Medium     Previously on coumadin but was discontinued and asa started  Fall risk       Encounter for counseling 08/29/2012     Priority: Medium     Overview:   Patient has identified Health Care Agent(s): Yes  Add Health Care Agents: Yes  Spouse Shmuel.  Health Care Agent(s):  Primary Health Care Agent:  Relationship:  Phone:    Secondary Health Care Agent:  Relationship:  Phone:    Conservator:  Relationship:  Phone:    Guardian: Relationship:  Phone:      Patient has Advance Care Plan Documents (Health Care Directive, POLST):     Patient has identified Specific Treatment Preferences: Full       Depressive disorder 08/29/2012     Priority: Medium     Fall 08/29/2012     Priority: Medium     Closed fracture of lumbar vertebra (H) 08/29/2012     Priority: Medium     Counseling for marital and partner problems 05/09/2012     Priority: Medium     Problem list name updated by automated process. Provider to review       Partial epilepsy with impairment of consciousness (H) 04/23/2012     Priority: Medium     Problem list name updated by automated process. Provider to review       Falls frequently 04/23/2012     Priority: Medium     Status post right hip replacement 1987 and 1993 10/21/2011     Priority: Medium     Cataract 09/12/2011     Priority: Medium     Utility update for deleted IMO code  [employer] Update utility       Hypothyroidism 04/18/2011     Priority: Medium     Advanced directives, counseling/discussion 04/18/2011     Priority: Medium     Patient has completed an Advance/Health Care Directive (HCD), to bring in copy to be scanned into [employer].    Padmini Nichole  April 18, 2011    Reviewed POLST  Signed by provider on 09/28/2012.    Raven Bermudez CMA    Health Care Directive received, reviewed for clarity and legality, and will be scanned into the patient's EMR chart.  Raven Bermudez CMA  ACP Facilitator             Constipation 04/18/2011     Priority: Medium     Major depressive disorder,  recurrent episode (H)      Priority: Medium     Problem list name updated by automated process. Provider to review       Malignant Neoplasm of Cerebrum, 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      Priority: Medium     Grade II astrocytoma diagnosed 1987; s/p biopsy but no resection and 30 XRT  Had left focal sz sept 1997 and focal sz 10/21/2011  Mri brain 9/97, 9/98,. 4/00, 1/30/03 at [place] and [facility] 2005 shows no change. But recent jan 2013 showed  A change and dr. Camara had done scan July 2013 and another is due in late September or early October 2013.   Last seen neurosurgery 12/97  Followed by dr. Suraj camara 2013       Hyperlipidemia with target LDL less than 130      Priority: Medium     Diagnosis updated by automated process. Provider to review and confirm.       Pulmonary embolism and infarction (H) 09/30/2009     Priority: Medium     Epilepsy (H) 09/30/2009     Priority: Medium       Surgical Hx:  has a past surgical history that includes COLONOSCOPY THRU STOMA W BIOPSY/CAUTERY TUMOR/POLYP/LESION; RECONSTRUC HIP SOCKET; TOTAL KNEE ARTHROPLASTY; and biopsy (8/04/15).    Medications:   Current Outpatient Prescriptions:      furosemide (LASIX) 20 MG tablet, Take 20 mg by mouth, Disp: , Rfl:      oxyCODONE (ROXICODONE) 5 MG IR tablet, , Disp: , Rfl:      HYDROcodone-acetaminophen (NORCO) 5-325 MG per tablet, Take 1 tablet by mouth every 6 hours as needed for moderate to severe pain, Disp: , Rfl:      carbidopa-levodopa (SINEMET CR)  MG per tablet, Take 1 tablet by mouth At Bedtime, Disp: 90 tablet, Rfl: 1     carbidopa-levodopa (SINEMET)  MG per tablet, Take 2 tablets by mouth 3 times daily Taking 7am,11am,4pm, Disp: 180 tablet, Rfl: 5     sertraline (ZOLOFT) 100 MG tablet, Take 1.5 tablets (150 mg) by mouth daily, Disp: 135 tablet, Rfl: 3     vitamin E 400 UNIT capsule, Take 1 capsule (400 Units) by mouth daily, Disp: 30 capsule, Rfl:      cholecalciferol (VITAMIN D) 1000  UNIT tablet, 1000 units daily, Disp: 30 tablet, Rfl:      senna-docusate (SENOKOT-S;PERICOLACE) 8.6-50 MG per tablet, Take 2 tablets by mouth daily as needed, Disp: , Rfl:      polyethylene glycol (MIRALAX/GLYCOLAX) packet, Take 17 g by mouth daily as needed, Disp: , Rfl:      ORDER FOR DME, Equipment being ordered: Wheelchair, Disp: 1 Device, Rfl: 1     hydrocortisone valerate (WEST-SIENNA) 0.2 % ointment, Apply topically 2 times daily Use up to two weeks then on an as needed basis to affected areas on face., Disp: 60 g, Rfl: 0     levothyroxine (SYNTHROID, LEVOTHROID) 112 MCG tablet, Take 1 tablet (112 mcg) by mouth daily, Disp: 90 tablet, Rfl: 1     pentoxifylline (TRENTAL) 400 MG CR tablet, Take 400 mg by mouth 3 times daily (with meals), Disp: , Rfl:      lamoTRIgine (LAMICTAL) 100 MG tablet, Take 200 mg by mouth 2 times daily, Disp: , Rfl:      ORDER FOR DME, Equipment being ordered: Commode seat lift without handles., Disp: 1 each, Rfl: 0     levetiracetam (KEPPRA) 750 MG tablet, Take 750 mg by mouth 2 times daily., Disp: , Rfl:      levetiracetam (KEPPRA) 500 MG tablet, Take 500 mg by mouth 2 times daily., Disp: , Rfl:      acetaminophen (TYLENOL) 500 MG tablet, Take 2-3 tablets by mouth every 6 hours as needed. 4 tabs qhs prn , Disp: , Rfl:      FISH OIL, 3 tablets daily., Disp: , Rfl:      ASPIRIN 81 MG OR TABS, 1 TABLET DAILY*, Disp: , Rfl:      Ondansetron HCl (ZOFRAN PO), Take 8 mg by mouth, Disp: , Rfl:      UNABLE TO FIND, MEDICATION NAME: Uristat (phenazopyridine) 190 mg oral, three times a day PRN, Disp: , Rfl:      fluocinonide (LIDEX) 0.05 % cream, Apply  topically 2 times daily., Disp: 45 g, Rfl: 0     calcium carbonate (OS-MIKE 600 MG [place]. [place]) 600 MG TABS tablet, Take 1,000 mg by mouth daily, Disp: , Rfl:      lacosamide (VIMPAT) 50 MG TABS, 50 mg daily DOSE UNKNOWN; 1 tab in the morning and 1 tab in the evening, Disp: 60 tablet, Rfl: 11     CALCIUM + D OR, 1 TABLET DAILY, Disp: , Rfl:       "MAGNESIUM OR, 1 TABLET DAILY, Disp: , Rfl:     Allergies:   Allergies   Allergen Reactions     Nickel Rash     Simvastatin [Hmg-Coa-R Inhibitors] Other (See Comments)     Muscle aches       Social Hx: patient. .   reports that she quit smoking about 8 years ago. She has never used smokeless tobacco. She reports that she drinks alcohol. She reports that she does not use illicit drugs.    Family Hx: family history includes CANCER in her father; Family History Negative in an other family member; Thyroid Disease in her mother. There is no history of DIABETES, Hypertension, CEREBROVASCULAR DISEASE, Glaucoma, or Macular Degeneration.       REVIEW OF SYSTEMS:  CONSTITUTIONAL:NEGATIVE for fever, chills, change in weight  INTEGUMENTARY/SKIN: NEGATIVE for worrisome rashes, moles or lesions  MUSCULOSKELETAL:See HPI above  NEURO: NEGATIVE for weakness, dizziness or paresthesias    This document serves as a record of the services and decisions personally performed and made by Alcides Laurent MD. It was created on his behalf by Jenny Ornelas, a trained medical scribe. The creation of this document is based the provider's statements to the medical scribe.    Scribe Jenny Ornelas 11:10 AM 11/13/2017       PHYSICAL EXAM:  Resp 16  Ht 1.727 m (5' 8\")  Wt 101.2 kg (223 lb)  BMI 33.91 kg/m2   GENERAL APPEARANCE: healthy, alert, no distress  SKIN: no suspicious lesions or rashes  NEURO: decreased  strength and tone, mentation intact and speech normal  PSYCH:  mentation appears normal and affect normal, not anxious  RESPIRATORY: No increased work of breathing.      RIGHT UPPER EXTREMITY:  Sensation intact to light touch in median, radial, ulnar and axillary nerve distributions  Palpable 2+ radial pulse, brisk capillary refill to all fingers, wwp  Intact epl fpl fdp edc wrist flexion/extension biceps triceps deltoid    RIGHT SHOULDER:  Shoulder Inspection: no swelling, bruising, discoloration, or obvious deformity or asymmetry  Tender: " nontender to palpation   Range of Motion:   Active: forward flexion 155 degrees, (170 left), external rotation 30 degrees (bilaterally)  Strength: forward flexion 4+/5, External rotation 4+/5  Impingement: all grade 2 positive        BILATERAL LOWER EXTREMITIES:  Gait: in wheelchair.  No gross deformities or masses.  Bilateral Quad atrophy, strength weak  Intact sensation deep peroneal nerve, superficial peroneal nerve, med/lat tibial nerve, sural nerve, saphenous nerve  Intact EHL, EDL, TA, FHL, GS, quadriceps hamstrings and hip flexors  Toes warm and well perfused, brisk capillary refill. Palpable 2+ dp pulses.  Bilateral calf soft and nttp or squeeze.  Edema: none      LEFT KNEE EXAM:    Skin: intact, no ecchymosis or erythema  ROM: 5 degrees extension to 115 degrees flexion  Tight hamstrings on straight leg raise.  Effusion: trace  Tender: diffuse med/lat joint line, anterior and posterior knee  McMurrays: negative     MCL: stable, and non-painful at both 0 and 30 degrees knee flexion  Varus stress: stable, and non-painful at both 0 and 30 degrees knee flexion  Lachmans: neg, firm endpoint  Posterior Drawer stable  Patellofemoral joint:                Apprehension: negative              Crepitations: positive   Grind: positive.    RIGHT KNEE EXAM:    Skin: intact, no ecchymosis or erythema  ROM: 0 extension to 120 flexion  Tight hamstrings on straight leg raise.  Effusion: none  Tender: medial joint line     MCL: stable, and non-painful at both 0 and 30 degrees knee flexion  Varus stress: stable, and non-painful at both 0 and 30 degrees knee flexion  Lachmans: neg, firm endpoint  Posterior Drawer stable  Patellofemoral joint:                Apprehension: negative              Crepitations: mild   Grind: positive.      X-RAY:  3 views right knee from 4/17/2017 were reviewed in clinic today. On my review, no obvious fractures or dislocations. There is moderate tricompartmental degenerative changes with marginal  "osteophytes, subchondral sclerosis.    Impression:   72 year old female with   1. Right knee pain, primary osteoarthritis.  2. Right shoulder pain, shoulder impingement syndrome and rotator cuff tendonitis.    Plan:   KNEE:  Non-surgical treatment for knee arthritis includes:    * rest, sitting  * Activity modification - avoid impact activities or activities that aggravate symptoms.  * NSAIDS (non-steroidal anti-inflammatory medications; e.g. Aleve, advil, motrin, ibuprofen) - regular use for inflammation ( twice daily or three times daily), with food, as long as no contra-indications Please discuss with primary care doctor if needed  * ice, 15-20 minutes at a time several times a day or as needed.  * Strengthening of quadriceps muscles  * Physical Therapy for strengthening, stretching and range of motion exercises of legs  * Tylenol as needed for pain, consider Tylenol arthritis or similar  * Weight loss: Weight loss:  Body mass index is 33.91 kg/(m^2).. weight loss benefits, not only for the current pain symptoms, but also overall health. Recommend a good diet plan that works for the patient, with the assistance of a dietician or primary care doctor as needed. Also, a good, low-impact exercise program for at least 20 minutes per day, 3 times per week, such as exercise bike, elliptical , or pool.  * Exercise: low impact such as stationary bike, elliptical, pool.  * Injections: cortisone versus viscosupplementation (hyaluronic acid, \"rooster comb\", \"gel shots\"); risks and perceived benefits discussed today. Patient elects to proceed.  * Bracing: bracing the knee may offer some relief of symptoms when worn and provide some stability.  * over the counter supplements such as glucosamine and chondroitin sulfate may help with joint pain.  * topical ointments may help as well    * return to clinic as needed.    SHOULDER:  * discussed clinical exam findings. Consistent with shoulder impingement syndrome and rotator " cuff  tendonitis.    Treatment:    * Rest  * Activity modification - avoid activities that aggravate symptoms or started symptoms at onset.  * NSAIDS - regular use for inflammation, with food, as long as no contra-indications. Please discuss with pcp if needed.  * Ice twice daily to three times daily, 15-20 minutes at a time  * heat may be beneficial prior to exercising  * Physical Therapy for strengthening, stretching and range of motion exercises of rotator cuff and periscapular stabilization.  * Tylenol as needed for pain  * Injections: cortisone injections may be beneficial to help decrease swelling and inflammation within the shoulder or bursa, and decrease pain. With decreased pain, Physical Therapy and exercises will be more effective and efficient. Patient elected to proceed.  * Return to clinic as needed  * consider MRI of the shoulder in future if symptoms persist despite the above regimen of treatment.      PROCEDURE NOTE:  The risks, perceived benefits and potential complications (including but not limited to: bleeding, infection, pain, scar, damage to adjacent structures, atrophy or necrosis of soft tissue, skin blanching, failure to relieve symptoms, worsening of symptoms, allergic reaction) of injection were discussed with the patient. Questions were addressed and answered.The patient elected to proceed. Written informed consent was obtained. The correct procedural site was identified and confirmed. A Right Knee intraarticular injection was performed using 1mL Depo Medrol 80mg per mL and 7mL (4mL 1% lidocaine, 3mL 0.25% marcaine)  of local anesthetic after sterile prep, to the correct procedural site. Sterile bandaid applied. This was tolerated well by the patient. No apparent complications. Did also discuss that if diabetic, recommend close monitoring of blood sugars over the next week as cortisone injections can temporarily elevate blood sugars.    PROCEDURE NOTE:  The risks, perceived benefits and  potential complications (including but not limited to: bleeding, infection, pain, scar, damage to adjacent structures, atrophy or necrosis of soft tissue, skin blanching, failure to relieve symptoms, worsening of symptoms, allergic reaction) of injection were discussed with the patient. Questions were addressed and answered.The patient elected to proceed. Written informed consent was obtained. The correct procedural site was identified and confirmed. A Right Shoulder subacromial injection was performed using 2mL Kenalog-40 40mg per mL and 7mL (4mL 1% lidocaine, 3mL 0.25% marcaine)  of local anesthetic after sterile prep, to the correct procedural site. Sterile bandaid applied. This was tolerated well by the patient. No apparent complications. Did also discuss that if diabetic, recommend close monitoring of blood sugars over the next week as cortisone injections can temporarily elevate blood sugars.       The information in this document, created by a scribe for me, accurately reflects the services I personally performed and the decisions made by me. I have reviewed and approved this document for accuracy.      Alcides Laurent M.D., M.S.  Dept. of Orthopaedic Surgery  [facility]          The patient's right shoulder was prepped with betadine solution after verification of allergies. Area approximately 10 cm x 10 cm prepped in a sterile fashion. After injection, betadine removed with soap and water and band-aids applied.    4cc Lidocaine 1%  NDC 2409-3645-73, LOT -[id number],  [id number]  3cc Bupivacaine 0.25% NDC 6072-3341-14, LOT -DK,  [id number]  2cc Kenalog 40 NDC 5035-7762-54, LOT [id number],  [id number]  injected into patient's right shoulder by Dr.Troy Laurent     The patient's right knee was prepped with betadine solution after verification of allergies. Area approximately 10 cm x 10 cm prepped in a sterile fashion. After injection, betadine removed with soap and water and band-aids  applied.    4cc Lidocaine 1%  NDC 4255-8024-15, LOT -DK,  2019  3cc Bupivacaine 0.25% NDC 6196-9187-12, LOT -DK,  2018  1cc Depo Medrol NDC 3428-6128-90, LOT [id number],  2020  injected into patient's right knee by Dr.Troy Laurent       Again, thank you for allowing me to participate in the care of your patient.        Sincerely,        Alcides Laurent MD

## 2017-11-20 ENCOUNTER — TELEPHONE (OUTPATIENT)
Dept: ORTHOPEDICS | Facility: CLINIC | Age: 72
End: 2017-11-20

## 2017-11-20 NOTE — TELEPHONE ENCOUNTER
Reason for Call:  Other call back    Detailed comments: Patient states everything is working out well with walking, this is just an [employer]    Phone Number Patient can be reached at: Home number on file [phone] (home)    Best Time: today    Can we leave a detailed message on this number? YES    Call taken on 11/20/2017 at 12:21 PM by Brijesh Magallanes

## 2018-08-01 ENCOUNTER — TRANSFERRED RECORDS (OUTPATIENT)
Dept: HEALTH INFORMATION MANAGEMENT | Facility: CLINIC | Age: 73
End: 2018-08-01

## 2019-02-14 ENCOUNTER — TRANSFERRED RECORDS (OUTPATIENT)
Dept: HEALTH INFORMATION MANAGEMENT | Facility: CLINIC | Age: 74
End: 2019-02-14

## 2019-02-18 ENCOUNTER — DOCUMENTATION ONLY (OUTPATIENT)
Dept: NEUROLOGY | Facility: CLINIC | Age: 74
End: 2019-02-18

## 2019-02-18 NOTE — PROGRESS NOTES
Received office visit from [facility] 2/18/19, sent to be scanned    Dejah Arnold CMA

## 2019-09-29 ENCOUNTER — HEALTH MAINTENANCE LETTER (OUTPATIENT)
Age: 74
End: 2019-09-29

## 2020-01-15 ENCOUNTER — TRANSFERRED RECORDS (OUTPATIENT)
Dept: HEALTH INFORMATION MANAGEMENT | Facility: CLINIC | Age: 75
End: 2020-01-15

## 2020-01-21 ENCOUNTER — TELEPHONE (OUTPATIENT)
Dept: NEUROLOGY | Facility: CLINIC | Age: 75
End: 2020-01-21

## 2020-01-21 NOTE — TELEPHONE ENCOUNTER
Received records from [facility]   Records Date of service: 1/15/20  Copy has been sent to scanning and encounter routed to specialty nurse for review. FYI was placed in provider folder

## 2020-03-15 ENCOUNTER — HEALTH MAINTENANCE LETTER (OUTPATIENT)
Age: 75
End: 2020-03-15

## 2020-04-03 ENCOUNTER — TRANSFERRED RECORDS (OUTPATIENT)
Dept: HEALTH INFORMATION MANAGEMENT | Facility: CLINIC | Age: 75
End: 2020-04-03

## 2020-04-22 ENCOUNTER — TRANSFERRED RECORDS (OUTPATIENT)
Dept: HEALTH INFORMATION MANAGEMENT | Facility: CLINIC | Age: 75
End: 2020-04-22

## 2020-07-29 ENCOUNTER — TRANSFERRED RECORDS (OUTPATIENT)
Dept: HEALTH INFORMATION MANAGEMENT | Facility: CLINIC | Age: 75
End: 2020-07-29

## 2021-01-07 ENCOUNTER — TRANSFERRED RECORDS (OUTPATIENT)
Dept: HEALTH INFORMATION MANAGEMENT | Facility: CLINIC | Age: 76
End: 2021-01-07

## 2021-01-14 ENCOUNTER — HEALTH MAINTENANCE LETTER (OUTPATIENT)
Age: 76
End: 2021-01-14

## 2021-02-04 ENCOUNTER — TELEPHONE (OUTPATIENT)
Dept: ORTHOPEDICS | Facility: CLINIC | Age: 76
End: 2021-02-04

## 2021-02-04 NOTE — TELEPHONE ENCOUNTER
Reason for Call:  Other questions      Detailed comments: Patient wants to know if the clinic has a rail for support if she gets a cortisone injection?    Phone Number Patient can be reached at: Home number on file [phone] (home)    Best Time: before noon    Can we leave a detailed message on this number? YES    Call taken on 2/4/2021 at 10:41 AM by Pearl De La Garza

## 2021-02-04 NOTE — TELEPHONE ENCOUNTER
Called and spoke to patient earlier regarding her questions about railings. I let her know I would need to talk to Dr. Laurent regarding assistance. I did call back and left a call back number to discuss. She will need to have someone to assist her to  order to get an injection. I will wait for a return call.  Tiffanie Ledezma Certified Medical Assistant

## 2021-02-05 ENCOUNTER — TELEPHONE (OUTPATIENT)
Dept: ORTHOPEDICS | Facility: CLINIC | Age: 76
End: 2021-02-05

## 2021-02-05 NOTE — TELEPHONE ENCOUNTER
Called and left a vm for patient to return call. Left a call back number.  Tiffanie Ledezma Certified Medical Assistant

## 2021-02-05 NOTE — TELEPHONE ENCOUNTER
Spoke to patient. Transferred her to the scheduling line to schedule an appointment with Dr. Laurent.   Tiffanie Ledezma Certified Medical Assistant

## 2021-02-12 ENCOUNTER — OFFICE VISIT (OUTPATIENT)
Dept: ORTHOPEDICS | Facility: CLINIC | Age: 76
End: 2021-02-12
Payer: COMMERCIAL

## 2021-02-12 VITALS — SYSTOLIC BLOOD PRESSURE: 146 MMHG | DIASTOLIC BLOOD PRESSURE: 82 MMHG

## 2021-02-12 DIAGNOSIS — M25.551 GREATER TROCHANTERIC PAIN SYNDROME OF RIGHT LOWER EXTREMITY: ICD-10-CM

## 2021-02-12 DIAGNOSIS — M17.11 PRIMARY OSTEOARTHRITIS OF RIGHT KNEE: Primary | ICD-10-CM

## 2021-02-12 PROCEDURE — 20610 DRAIN/INJ JOINT/BURSA W/O US: CPT | Mod: 51 | Performed by: ORTHOPAEDIC SURGERY

## 2021-02-12 RX ORDER — BUPIVACAINE HYDROCHLORIDE 2.5 MG/ML
4 INJECTION, SOLUTION INFILTRATION; PERINEURAL
Status: SHIPPED | OUTPATIENT
Start: 2021-02-12

## 2021-02-12 RX ORDER — METHYLPREDNISOLONE ACETATE 80 MG/ML
80 INJECTION, SUSPENSION INTRA-ARTICULAR; INTRALESIONAL; INTRAMUSCULAR; SOFT TISSUE
Status: SHIPPED | OUTPATIENT
Start: 2021-02-12

## 2021-02-12 RX ADMIN — BUPIVACAINE HYDROCHLORIDE 4 ML: 2.5 INJECTION, SOLUTION INFILTRATION; PERINEURAL at 09:57

## 2021-02-12 RX ADMIN — METHYLPREDNISOLONE ACETATE 80 MG: 80 INJECTION, SUSPENSION INTRA-ARTICULAR; INTRALESIONAL; INTRAMUSCULAR; SOFT TISSUE at 09:57

## 2021-02-12 ASSESSMENT — PAIN SCALES - GENERAL: PAINLEVEL: WORST PAIN (10)

## 2021-02-12 NOTE — LETTER
2/12/2021         RE: Kamila Strauss  [address]   [place] [address]        Dear Colleague,    Thank you for referring your patient, Kamila Strauss, to the [facility]. Please see a copy of my visit note below.    CHIEF COMPLAINT:   Chief Complaint   Patient presents with     Right Knee - Follow Up     Right greater trochanteric bursa pain radiates down to the knee. Onset: 3 months. She is non-ambulatory for the most part. This doesn't bother when she is in her chair, but when she lies down at night it really bothers her.       HISTORY OF PRESENT ILLNESS    Kamila Strauss is a 75 year old female seen for right hip pain. She had a total hip arthroplasty about 30+ years ago. Pain originates in the distal thigh and radiates to the hip. She had a cortisone injection 2016 for bursitis, which helped. Doesn't have pain sitting, just with laying in bed. She is mostly nonambulatory, so doesn't really have pain with walking. She has Parkinsons disease.    Also right knee pain. Had injection 11/2017. Pain for years, worse recently. Locates most pain along the outer aspect.          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), Ataxia (9/15/2013), Back pain, Basal cell carcinoma of anterior chest (8/25/2014), Cataract, Chronic constipation, Chronic low back pain (9/16/2013), Colon polyp, Colon polyp, Deep venous thrombosis (H) (9/16/2013), Diverticulosis, Former smoker (9/16/2013), Gait disorder (8/22/2014), Hip replacement, Hyperlipidemia LDL goal < 130, Hypothyroid, Inflammatory arthritis, Localization-related (focal) (partial) epilepsy and epileptic syndromes with complex partial seizures, with intractable epilepsy, Localization-related (focal) (partial) epilepsy and epileptic syndromes with complex partial seizures, without mention of intractable epilepsy, Major depressive disorder, recurrent episode, unspecified, Malignant neoplasm  of cerebrum, except lobes and ventricles (H), Osteoporosis (7/8/2013), Other convulsions, and Seizure disorder (H). She also has no past medical history of Amblyopia, Bleeding disorder (H), Diabetes mellitus (H), Diabetic retinopathy (H), Glaucoma (increased eye pressure), Heart disease, Hypertension, Kidney disease, Retinal detachment, Senile macular degeneration, Strabismus, Stroke (H), Surgical complications, Type II or unspecified type diabetes mellitus without mention of complication, not stated as uncontrolled, Unspecified asthma(493.90), or Uveitis.   Patient Active Problem List    Diagnosis Date Noted     Meibomian gland dysfunction 01/13/2015     Priority: Medium     Health Care Home (Active) 10/23/2014     Priority: Medium       Care Coordinator:  Rekha Don    See Letters for [facility] Care Plan  Date:  October 23, 2014             Basal cell carcinoma of anterior chest 08/25/2014     Priority: Medium     Abnormal brain MRI 08/25/2014     Priority: Medium     Indication:  Brain tumor, followup.  Astrocytoma.    Comparison:  04/29/2014.    Technique:  Multiplanar T1-T2, FLAIR and diffusion-weighted imaging.  Post gadolinium T1 sequences.    Findings:  Compared to the prior exam, there is a stable mass within the right posterior frontal lobe at the vertex measuring approximately 3.1 x 3.8 cm in transverse and AP dimensions with predominantly low T1 and high T2 signal intensity.  There are internal areas of T2 shortening which may represent degraded blood products or calcification.  Post-contrast imaging again demonstrates heterogeneous enhancement.  There is stable mild T2 prolongation within the white matter inferior to the mass consistent with edema.    Otherwise, moderate generalized cerebral volume loss.  Patchy areas of T2/FLAIR signal within the periventricular and subcortical white matter which likely represent small vessel ischemic changes.  No acute intracranial hemorrhage.  No abnormal ventricular  dilatation.  Intracranial vascular flow voids are preserved.  No mass or mass effect.  No midline shift.  No restricted diffusion to suggest acute ischemia.  No new areas of abnormal enhancement or enhancing lesions.  Bilateral orbits are unremarkable.  Normal appearing sella.  Fluid and mucosal thickening nearly completely opacifies the paranasal sinuses.  The mastoid air cells are clear.    Impression:   1. No interval change.   2. Stable size and appearance of the heterogeneous enhancing tumor in the posterior right frontal lobe at the vertex.  No evidence of progression of tumor.   3. Moderate generalized cerebral volume loss.  Chronic deep white matter small vessel ischemic changes.   4. No acute intracranial abnormality.   5. Sinusitis.    Dictated by Denzel Jacinto MD @ Aug 14 2014 10:08AM    Signed by Dr. Denzel Jacinto @ Aug 14 2014 10:14AM         Gait disorder 08/22/2014     Priority: Medium     Impingement syndrome of right shoulder 10/07/2013     Priority: Medium     Menopause 09/16/2013     Priority: Medium     Vaginal bleeding 7/15/2002 s/p d and c       Malaria 09/16/2013     Priority: Medium     1997 after returning from [place]       Former smoker 09/16/2013     Priority: Medium     Rotator cuff injury 09/16/2013     Priority: Medium     right       Deep venous thrombosis (H) 09/16/2013     Priority: Medium     Chronic low back pain 09/16/2013     Priority: Medium     Pelvic fracture (H) 09/16/2013     Priority: Medium     10/16/2010       Astrocytoma (H) 09/15/2013     Priority: Medium     Managed by Dr Suraj Camara MD [facility].       Ataxia 09/15/2013     Priority: Medium     Closed fracture of condyle of humerus 07/25/2013     Priority: Medium     Closed fracture lateral condyle humerus 07/22/2013     Priority: Medium     Accidental fall on or from stairs or steps 07/08/2013     Priority: Medium     Osteoporosis 07/08/2013     Priority: Medium     Vertebral fracture L3 post fall  8/28/2012 09/04/2012     Priority: Medium     Dr Ha in [place].         Bradycardia, chronic 09/04/2012     Priority: Medium     Asymptomatic  No AV block on ECG  Normal QT interval       Vestibular neuropathy 09/04/2012     Priority: Medium     H/o lower extremity weakness. Chronic use of a walker due to imbalance  rxd with meclizine in 1997       Seizure disorder (H) 09/04/2012     Priority: Medium     History of bilat pulmonary embolism 2010 09/04/2012     Priority: Medium     Previously on coumadin but was discontinued and asa started  Fall risk       Encounter for counseling 08/29/2012     Priority: Medium     Overview:   Patient has identified Health Care Agent(s): Yes  Add Health Care Agents: Yes  Spouse Shmuel.  Health Care Agent(s):  Primary Health Care Agent:  Relationship:  Phone:    Secondary Health Care Agent:  Relationship:  Phone:    Conservator:  Relationship:  Phone:    Guardian: Relationship:  Phone:      Patient has Advance Care Plan Documents (Health Care Directive, POLST):     Patient has identified Specific Treatment Preferences: Full       Depressive disorder 08/29/2012     Priority: Medium     Fall 08/29/2012     Priority: Medium     Closed fracture of lumbar vertebra (H) 08/29/2012     Priority: Medium     Counseling for marital and partner problems 05/09/2012     Priority: Medium     Problem list name updated by automated process. Provider to review       Partial epilepsy with impairment of consciousness (H) 04/23/2012     Priority: Medium     Problem list name updated by automated process. Provider to review       Falls frequently 04/23/2012     Priority: Medium     Status post right hip replacement 1987 and 1993 10/21/2011     Priority: Medium     Cataract 09/12/2011     Priority: Medium     Utility update for deleted IMO code  Im[employer] Update utility       Hypothyroidism 04/18/2011     Priority: Medium     Advanced directives, counseling/discussion 04/18/2011     Priority:  Medium     Patient has completed an Advance/Health Care Directive (HCD), to bring in copy to be scanned into [facility].    Padmini Nichole  April 18, 2011    Reviewed POLST  Signed by provider on 09/28/2012.    Raven Bermudez CMA    Health Care Directive received, reviewed for clarity and legality, and will be scanned into the patient's EMR chart.  Raven Bermudez CMA  ACP Facilitator             Constipation 04/18/2011     Priority: Medium     Major depressive disorder, recurrent episode (H)      Priority: Medium     Problem list name updated by automated process. Provider to review       Malignant Neoplasm of Cerebrum, 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      Priority: Medium     Grade II astrocytoma diagnosed 1987; s/p biopsy but no resection and 30 XRT  Had left focal sz sept 1997 and focal sz 10/21/2011  Mri brain 9/97, 9/98,. 4/00, 1/30/03 at [facility] 2005 shows no change. But recent jan 2013 showed  A change and dr. Camara had done scan July 2013 and another is due in late September or early October 2013.   Last seen neurosurgery 12/97  Followed by dr. Suraj camara 2013       Hyperlipidemia with target LDL less than 130      Priority: Medium     Diagnosis updated by automated process. Provider to review and confirm.       Pulmonary embolism and infarction (H) 09/30/2009     Priority: Medium     Epilepsy (H) 09/30/2009     Priority: Medium       Surgical Hx:  has a past surgical history that includes COLONOSCOPY THRU STOMA W BIOPSY/CAUTERY TUMOR/POLYP/LESION; RECONSTRUC HIP SOCKET; TOTAL KNEE ARTHROPLASTY; and biopsy (8/04/15).    Medications:   Current Outpatient Medications:      acetaminophen (TYLENOL) 500 MG tablet, Take 2-3 tablets by mouth every 6 hours as needed. 4 tabs qhs prn , Disp: , Rfl:      ASPIRIN 81 MG OR TABS, 1 TABLET DAILY*, Disp: , Rfl:      CALCIUM + D OR, 1 TABLET DAILY, Disp: , Rfl:      calcium carbonate (OS-MIKE 600 MG [place]. CA) 600 MG TABS tablet, Take 1,000 mg by  mouth daily, Disp: , Rfl:      carbidopa-levodopa (SINEMET CR)  MG per tablet, Take 1 tablet by mouth At Bedtime, Disp: 90 tablet, Rfl: 1     carbidopa-levodopa (SINEMET)  MG per tablet, Take 2 tablets by mouth 3 times daily Taking 7am,11am,4pm, Disp: 180 tablet, Rfl: 5     cholecalciferol (VITAMIN D) 1000 UNIT tablet, 1000 units daily, Disp: 30 tablet, Rfl:      FISH OIL, 3 tablets daily., Disp: , Rfl:      fluocinonide (LIDEX) 0.05 % cream, Apply  topically 2 times daily., Disp: 45 g, Rfl: 0     furosemide (LASIX) 20 MG tablet, Take 20 mg by mouth, Disp: , Rfl:      HYDROcodone-acetaminophen (NORCO) 5-325 MG per tablet, Take 1 tablet by mouth every 6 hours as needed for moderate to severe pain, Disp: , Rfl:      hydrocortisone valerate (WEST-SIENNA) 0.2 % ointment, Apply topically 2 times daily Use up to two weeks then on an as needed basis to affected areas on face., Disp: 60 g, Rfl: 0     lacosamide (VIMPAT) 50 MG TABS, 50 mg daily DOSE UNKNOWN; 1 tab in the morning and 1 tab in the evening, Disp: 60 tablet, Rfl: 11     lamoTRIgine (LAMICTAL) 100 MG tablet, Take 200 mg by mouth 2 times daily, Disp: , Rfl:      levetiracetam (KEPPRA) 500 MG tablet, Take 500 mg by mouth 2 times daily., Disp: , Rfl:      levetiracetam (KEPPRA) 750 MG tablet, Take 750 mg by mouth 2 times daily., Disp: , Rfl:      levothyroxine (SYNTHROID, LEVOTHROID) 112 MCG tablet, Take 1 tablet (112 mcg) by mouth daily, Disp: 90 tablet, Rfl: 1     MAGNESIUM OR, 1 TABLET DAILY, Disp: , Rfl:      Ondansetron HCl (ZOFRAN PO), Take 8 mg by mouth, Disp: , Rfl:      ORDER FOR DME, Equipment being ordered: Wheelchair, Disp: 1 Device, Rfl: 1     ORDER FOR DME, Equipment being ordered: Commode seat lift without handles., Disp: 1 each, Rfl: 0     oxyCODONE (ROXICODONE) 5 MG IR tablet, , Disp: , Rfl:      pentoxifylline (TRENTAL) 400 MG CR tablet, Take 400 mg by mouth 3 times daily (with meals), Disp: , Rfl:      polyethylene glycol  (MIRALAX/GLYCOLAX) packet, Take 17 g by mouth daily as needed, Disp: , Rfl:      senna-docusate (SENOKOT-S;PERICOLACE) 8.6-50 MG per tablet, Take 2 tablets by mouth daily as needed, Disp: , Rfl:      sertraline (ZOLOFT) 100 MG tablet, Take 1.5 tablets (150 mg) by mouth daily, Disp: 135 tablet, Rfl: 3     UNABLE TO FIND, MEDICATION NAME: Uristat (phenazopyridine) 190 mg oral, three times a day PRN, Disp: , Rfl:      vitamin E 400 UNIT capsule, Take 1 capsule (400 Units) by mouth daily, Disp: 30 capsule, Rfl:     Allergies:   Allergies   Allergen Reactions     Nickel Rash     Simvastatin [Hmg-Coa-R Inhibitors] Other (See Comments)     Muscle aches       Social Hx: . Living in nursing facility.   reports that she quit smoking about 11 years ago. She has never used smokeless tobacco. She reports current alcohol use. She reports that she does not use drugs.    Family Hx: family history includes Cancer in her father; Family History Negative in an other family member; Thyroid Disease in her mother.    REVIEW OF SYSTEMS:   CONSTITUTIONAL:NEGATIVE for fever, chills, change in weight  INTEGUMENTARY/SKIN: NEGATIVE for worrisome rashes, moles or lesions  MUSCULOSKELETAL:See HPI above  NEURO: NEGATIVE for weakness, dizziness or paresthesias    PHYSICAL EXAM:  BP (!) 146/82    GENERAL APPEARANCE: healthy, alert, no distress  SKIN: no suspicious lesions or rashes  NEURO: decreased strength and tone, mentation intact and speech normal; left upper extremity tremor noted.  PSYCH:  mentation appears normal and affect normal, not anxious  RESPIRATORY: No increased work of breathing.    BILATERAL LOWER EXTREMITIES:  Gait: not assessed, in wheelchair  No gross deformities or masses.  Bilateral Quad atrophy, weak  Intact sensation deep peroneal nerve, superficial peroneal nerve, med/lat tibial nerve, sural nerve, saphenous nerve  Intact EHL, EDL, TA, FHL, GS, quadriceps hamstrings and hip flexors  Toes warm and well perfused, brisk  capillary refill. Palpable 2+ dp pulses.  Bilateral calf soft and nttp or squeeze.  Edema: none      RIGHT KNEE EXAM:    Skin: intact, no ecchymosis or erythema  ROM: 5 extension to 115 flexion  Tight hamstrings on straight leg raise.  Effusion: none  Tender: medial joint line, lateral joint line  nontender to palpation anterior or posterior knee    MCL: stable, and non-painful at both 0 and 30 degrees knee flexion  Varus stress: stable, and non-painful at both 0 and 30 degrees knee flexion  Lachmans: neg, firm endpoint  Posterior Drawer stable  Patellofemoral joint:                Apprehension: negative              Crepitations: positive   Grind: positive    RIGHT HIP EXAM:    Palpation: Tender:   right greater trochanter, right gluteus medius, right IT band from hip to the knee, right adductors  Special tests:  Irritability (flexion/adduction/internal rotation) negative  Hip range of motion: grossly intact  Leg lengths: not tested        X-RAY:  no new images today.    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 Vertical acetabular implant stable alignment to prior. There is eccentric positioning of the femoral head within the acetabulum, grossly stable to prior xrays 10/20/2011. No obvious loosening. Possible osteolysis around the acetabular component. Lumbar and left hip degenerative changes. Degenerative changes of the pubic symphysis.    Right knee 4/17/2017:  No fracture or osseous lesion is seen. There is moderate diffuse joint space loss in the medial compartment and to a somewhat  lesser degree the lateral compartment. There is mild joint space loss in the patellofemoral articulation. Small osteophytes are seen along all the joint margins. No other abnormality is seen.          Impression:   76yo female with :  1) chronic right hip pain, trochanteric pain syndrome  2) chronic right knee pain, primary osteoarthritis.    Plan:  * right knee injection  *  right hip trochanteric bursal injection  * return to clinic as needed.        Alcides Laurent M.D., M.S.  Dept. of Orthopaedic Surgery  [facility]    Large Joint Injection/Arthocentesis: R knee joint    Date/Time: 2/12/2021 9:57 AM  Performed by: Sam Almanza PA  Authorized by: Alcides Laurent MD     Indications:  Pain and osteoarthritis  Needle Size:  22 G  Guidance: landmark guided    Approach:  Anteromedial  Location:  Knee      Medications:  80 mg methylPREDNISolone 80 MG/ML; 4 mL bupivacaine 0.25 %  Outcome:  Tolerated well, no immediate complications  Procedure discussed: discussed risks, benefits, and alternatives    Consent Given by:  Patient  Prep: patient was prepped and draped in usual sterile fashion    Large Joint Injection/Arthocentesis: R greater trochanteric bursa    Date/Time: 2/12/2021 9:57 AM  Performed by: Sam Almanza PA  Authorized by: Alcides Laurent MD     Indications:  Pain  Needle Size:  22 G  Approach:  Lateral  Location:  Hip      Site:  R greater trochanteric bursa  Medications:  80 mg methylPREDNISolone 80 MG/ML; 4 mL bupivacaine 0.25 %  Outcome:  Tolerated well, no immediate complications  Procedure discussed: discussed risks, benefits, and alternatives    Consent Given by:  Patient  Prep: patient was prepped and draped in usual sterile fashion              Again, thank you for allowing me to participate in the care of your patient.        Sincerely,        Alcides Laurent MD

## 2021-02-12 NOTE — PROGRESS NOTES
CHIEF COMPLAINT:   Chief Complaint   Patient presents with     Right Knee - Follow Up     Right greater trochanteric bursa pain radiates down to the knee. Onset: 3 months. She is non-ambulatory for the most part. This doesn't bother when she is in her chair, but when she lies down at night it really bothers her.       HISTORY OF PRESENT ILLNESS    Kamila Strauss is a 75 year old female seen for right hip pain. She had a total hip arthroplasty about 30+ years ago. Pain originates in the distal thigh and radiates to the hip. She had a cortisone injection 2016 for bursitis, which helped. Doesn't have pain sitting, just with laying in bed. She is mostly nonambulatory, so doesn't really have pain with walking. She has Parkinsons disease.    Also right knee pain. Had injection 11/2017. Pain for years, worse recently. Locates most pain along the outer aspect.          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), Ataxia (9/15/2013), Back pain, Basal cell carcinoma of anterior chest (8/25/2014), Cataract, Chronic constipation, Chronic low back pain (9/16/2013), Colon polyp, Colon polyp, Deep venous thrombosis (H) (9/16/2013), Diverticulosis, Former smoker (9/16/2013), Gait disorder (8/22/2014), Hip replacement, Hyperlipidemia LDL goal < 130, Hypothyroid, Inflammatory arthritis, Localization-related (focal) (partial) epilepsy and epileptic syndromes with complex partial seizures, with intractable epilepsy, Localization-related (focal) (partial) epilepsy and epileptic syndromes with complex partial seizures, without mention of intractable epilepsy, Major depressive disorder, recurrent episode, unspecified, Malignant neoplasm of cerebrum, except lobes and ventricles (H), Osteoporosis (7/8/2013), Other convulsions, and Seizure disorder (H). She also has no past medical history of Amblyopia, Bleeding disorder (H), Diabetes mellitus (H), Diabetic retinopathy (H), Glaucoma  (increased eye pressure), Heart disease, Hypertension, Kidney disease, Retinal detachment, Senile macular degeneration, Strabismus, Stroke (H), Surgical complications, Type II or unspecified type diabetes mellitus without mention of complication, not stated as uncontrolled, Unspecified asthma(493.90), or Uveitis.   Patient Active Problem List    Diagnosis Date Noted     Meibomian gland dysfunction 01/13/2015     Priority: Medium     Health Care Home (Active) 10/23/2014     Priority: Medium       Care Coordinator:  Rekha Don    See Letters for [facility] Care Plan  Date:  October 23, 2014             Basal cell carcinoma of anterior chest 08/25/2014     Priority: Medium     Abnormal brain MRI 08/25/2014     Priority: Medium     Indication:  Brain tumor, followup.  Astrocytoma.    Comparison:  04/29/2014.    Technique:  Multiplanar T1-T2, FLAIR and diffusion-weighted imaging.  Post gadolinium T1 sequences.    Findings:  Compared to the prior exam, there is a stable mass within the right posterior frontal lobe at the vertex measuring approximately 3.1 x 3.8 cm in transverse and AP dimensions with predominantly low T1 and high T2 signal intensity.  There are internal areas of T2 shortening which may represent degraded blood products or calcification.  Post-contrast imaging again demonstrates heterogeneous enhancement.  There is stable mild T2 prolongation within the white matter inferior to the mass consistent with edema.    Otherwise, moderate generalized cerebral volume loss.  Patchy areas of T2/FLAIR signal within the periventricular and subcortical white matter which likely represent small vessel ischemic changes.  No acute intracranial hemorrhage.  No abnormal ventricular dilatation.  Intracranial vascular flow voids are preserved.  No mass or mass effect.  No midline shift.  No restricted diffusion to suggest acute ischemia.  No new areas of abnormal enhancement or enhancing lesions.  Bilateral orbits are  unremarkable.  Normal appearing sella.  Fluid and mucosal thickening nearly completely opacifies the paranasal sinuses.  The mastoid air cells are clear.    Impression:   1. No interval change.   2. Stable size and appearance of the heterogeneous enhancing tumor in the posterior right frontal lobe at the vertex.  No evidence of progression of tumor.   3. Moderate generalized cerebral volume loss.  Chronic deep white matter small vessel ischemic changes.   4. No acute intracranial abnormality.   5. Sinusitis.    Dictated by Denzel Jacinto MD @ Aug 14 2014 10:08AM    Signed by Dr. Denzel Jacinto @ Aug 14 2014 10:14AM         Gait disorder 08/22/2014     Priority: Medium     Impingement syndrome of right shoulder 10/07/2013     Priority: Medium     Menopause 09/16/2013     Priority: Medium     Vaginal bleeding 7/15/2002 s/p d and c       Malaria 09/16/2013     Priority: Medium     1997 after returning from [place]       Former smoker 09/16/2013     Priority: Medium     Rotator cuff injury 09/16/2013     Priority: Medium     right       Deep venous thrombosis (H) 09/16/2013     Priority: Medium     Chronic low back pain 09/16/2013     Priority: Medium     Pelvic fracture (H) 09/16/2013     Priority: Medium     10/16/2010       Astrocytoma (H) 09/15/2013     Priority: Medium     Managed by Dr Suraj Camara MD [facility].       Ataxia 09/15/2013     Priority: Medium     Closed fracture of condyle of humerus 07/25/2013     Priority: Medium     Closed fracture lateral condyle humerus 07/22/2013     Priority: Medium     Accidental fall on or from stairs or steps 07/08/2013     Priority: Medium     Osteoporosis 07/08/2013     Priority: Medium     Vertebral fracture L3 post fall 8/28/2012 09/04/2012     Priority: Medium     Dr Ha in [facility].         Bradycardia, chronic 09/04/2012     Priority: Medium     Asymptomatic  No AV block on ECG  Normal QT interval       Vestibular neuropathy 09/04/2012      Priority: Medium     H/o lower extremity weakness. Chronic use of a walker due to imbalance  rxd with meclizine in 1997       Seizure disorder (H) 09/04/2012     Priority: Medium     History of bilat pulmonary embolism 2010 09/04/2012     Priority: Medium     Previously on coumadin but was discontinued and asa started  Fall risk       Encounter for counseling 08/29/2012     Priority: Medium     Overview:   Patient has identified Health Care Agent(s): Yes  Add Health Care Agents: Yes  Spouse Shmuel.  Health Care Agent(s):  Primary Health Care Agent:  Relationship:  Phone:    Secondary Health Care Agent:  Relationship:  Phone:    Conservator:  Relationship:  Phone:    Guardian: Relationship:  Phone:      Patient has Advance Care Plan Documents (Health Care Directive, POLST):     Patient has identified Specific Treatment Preferences: Full       Depressive disorder 08/29/2012     Priority: Medium     Fall 08/29/2012     Priority: Medium     Closed fracture of lumbar vertebra (H) 08/29/2012     Priority: Medium     Counseling for marital and partner problems 05/09/2012     Priority: Medium     Problem list name updated by automated process. Provider to review       Partial epilepsy with impairment of consciousness (H) 04/23/2012     Priority: Medium     Problem list name updated by automated process. Provider to review       Falls frequently 04/23/2012     Priority: Medium     Status post right hip replacement 1987 and 1993 10/21/2011     Priority: Medium     Cataract 09/12/2011     Priority: Medium     Utility update for deleted IMO code  Imo Update utility       Hypothyroidism 04/18/2011     Priority: Medium     Advanced directives, counseling/discussion 04/18/2011     Priority: Medium     Patient has completed an Advance/Health Care Directive (HCD), to bring in copy to be scanned into Epic.    Padmini Nichole  April 18, 2011    Reviewed POLST  Signed by provider on 09/28/2012.    Raven Bermudez [facility]    Health Care Directive  received, reviewed for clarity and legality, and will be scanned into the patient's EMR chart.  Raven Bermudez [facility]  ACP Facilitator             Constipation 04/18/2011     Priority: Medium     Major depressive disorder, recurrent episode (H)      Priority: Medium     Problem list name updated by automated process. Provider to review       Malignant Neoplasm of Cerebrum, 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      Priority: Medium     Grade II astrocytoma diagnosed 1987; s/p biopsy but no resection and 30 XRT  Had left focal sz sept 1997 and focal sz 10/21/2011  Mri brain 9/97, 9/98,. 4/00, 1/30/03 at [place] and [facility] 2005 shows no change. But recent jan 2013 showed  A change and dr. Camara had done scan July 2013 and another is due in late September or early October 2013.   Last seen neurosurgery 12/97  Followed by dr. Surja camara 2013       Hyperlipidemia with target LDL less than 130      Priority: Medium     Diagnosis updated by automated process. Provider to review and confirm.       Pulmonary embolism and infarction (H) 09/30/2009     Priority: Medium     Epilepsy (H) 09/30/2009     Priority: Medium       Surgical Hx:  has a past surgical history that includes COLONOSCOPY THRU STOMA W BIOPSY/CAUTERY TUMOR/POLYP/LESION; RECONSTRUC HIP SOCKET; TOTAL KNEE ARTHROPLASTY; and biopsy (8/04/15).    Medications:   Current Outpatient Medications:      acetaminophen (TYLENOL) 500 MG tablet, Take 2-3 tablets by mouth every 6 hours as needed. 4 tabs qhs prn , Disp: , Rfl:      ASPIRIN 81 MG OR TABS, 1 TABLET DAILY*, Disp: , Rfl:      CALCIUM + D OR, 1 TABLET DAILY, Disp: , Rfl:      calcium carbonate (OS-MIKE 600 MG [place]. CA) 600 MG TABS tablet, Take 1,000 mg by mouth daily, Disp: , Rfl:      carbidopa-levodopa (SINEMET CR)  MG per tablet, Take 1 tablet by mouth At Bedtime, Disp: 90 tablet, Rfl: 1     carbidopa-levodopa (SINEMET)  MG per tablet, Take 2 tablets by mouth 3 times daily Taking  7am,11am,4pm, Disp: 180 tablet, Rfl: 5     cholecalciferol (VITAMIN D) 1000 UNIT tablet, 1000 units daily, Disp: 30 tablet, Rfl:      FISH OIL, 3 tablets daily., Disp: , Rfl:      fluocinonide (LIDEX) 0.05 % cream, Apply  topically 2 times daily., Disp: 45 g, Rfl: 0     furosemide (LASIX) 20 MG tablet, Take 20 mg by mouth, Disp: , Rfl:      HYDROcodone-acetaminophen (NORCO) 5-325 MG per tablet, Take 1 tablet by mouth every 6 hours as needed for moderate to severe pain, Disp: , Rfl:      hydrocortisone valerate (WEST-SIENNA) 0.2 % ointment, Apply topically 2 times daily Use up to two weeks then on an as needed basis to affected areas on face., Disp: 60 g, Rfl: 0     lacosamide (VIMPAT) 50 MG TABS, 50 mg daily DOSE UNKNOWN; 1 tab in the morning and 1 tab in the evening, Disp: 60 tablet, Rfl: 11     lamoTRIgine (LAMICTAL) 100 MG tablet, Take 200 mg by mouth 2 times daily, Disp: , Rfl:      levetiracetam (KEPPRA) 500 MG tablet, Take 500 mg by mouth 2 times daily., Disp: , Rfl:      levetiracetam (KEPPRA) 750 MG tablet, Take 750 mg by mouth 2 times daily., Disp: , Rfl:      levothyroxine (SYNTHROID, LEVOTHROID) 112 MCG tablet, Take 1 tablet (112 mcg) by mouth daily, Disp: 90 tablet, Rfl: 1     MAGNESIUM OR, 1 TABLET DAILY, Disp: , Rfl:      Ondansetron HCl (ZOFRAN PO), Take 8 mg by mouth, Disp: , Rfl:      ORDER FOR DME, Equipment being ordered: Wheelchair, Disp: 1 Device, Rfl: 1     ORDER FOR DME, Equipment being ordered: Commode seat lift without handles., Disp: 1 each, Rfl: 0     oxyCODONE (ROXICODONE) 5 MG IR tablet, , Disp: , Rfl:      pentoxifylline (TRENTAL) 400 MG CR tablet, Take 400 mg by mouth 3 times daily (with meals), Disp: , Rfl:      polyethylene glycol (MIRALAX/GLYCOLAX) packet, Take 17 g by mouth daily as needed, Disp: , Rfl:      senna-docusate (SENOKOT-S;PERICOLACE) 8.6-50 MG per tablet, Take 2 tablets by mouth daily as needed, Disp: , Rfl:      sertraline (ZOLOFT) 100 MG tablet, Take 1.5 tablets (150  mg) by mouth daily, Disp: 135 tablet, Rfl: 3     UNABLE TO FIND, MEDICATION NAME: Uristat (phenazopyridine) 190 mg oral, three times a day PRN, Disp: , Rfl:      vitamin E 400 UNIT capsule, Take 1 capsule (400 Units) by mouth daily, Disp: 30 capsule, Rfl:     Allergies:   Allergies   Allergen Reactions     Nickel Rash     Simvastatin [Hmg-Coa-R Inhibitors] Other (See Comments)     Muscle aches       Social Hx: . Living in nursing facility.   reports that she quit smoking about 11 years ago. She has never used smokeless tobacco. She reports current alcohol use. She reports that she does not use drugs.    Family Hx: family history includes Cancer in her father; Family History Negative in an other family member; Thyroid Disease in her mother.    REVIEW OF SYSTEMS:   CONSTITUTIONAL:NEGATIVE for fever, chills, change in weight  INTEGUMENTARY/SKIN: NEGATIVE for worrisome rashes, moles or lesions  MUSCULOSKELETAL:See HPI above  NEURO: NEGATIVE for weakness, dizziness or paresthesias    PHYSICAL EXAM:  BP (!) 146/82    GENERAL APPEARANCE: healthy, alert, no distress  SKIN: no suspicious lesions or rashes  NEURO: decreased strength and tone, mentation intact and speech normal; left upper extremity tremor noted.  PSYCH:  mentation appears normal and affect normal, not anxious  RESPIRATORY: No increased work of breathing.    BILATERAL LOWER EXTREMITIES:  Gait: not assessed, in wheelchair  No gross deformities or masses.  Bilateral Quad atrophy, weak  Intact sensation deep peroneal nerve, superficial peroneal nerve, med/lat tibial nerve, sural nerve, saphenous nerve  Intact EHL, EDL, TA, FHL, GS, quadriceps hamstrings and hip flexors  Toes warm and well perfused, brisk capillary refill. Palpable 2+ dp pulses.  Bilateral calf soft and nttp or squeeze.  Edema: none      RIGHT KNEE EXAM:    Skin: intact, no ecchymosis or erythema  ROM: 5 extension to 115 flexion  Tight hamstrings on straight leg raise.  Effusion:  none  Tender: medial joint line, lateral joint line  nontender to palpation anterior or posterior knee    MCL: stable, and non-painful at both 0 and 30 degrees knee flexion  Varus stress: stable, and non-painful at both 0 and 30 degrees knee flexion  Lachmans: neg, firm endpoint  Posterior Drawer stable  Patellofemoral joint:                Apprehension: negative              Crepitations: positive   Grind: positive    RIGHT HIP EXAM:    Palpation: Tender:   right greater trochanter, right gluteus medius, right IT band from hip to the knee, right adductors  Special tests:  Irritability (flexion/adduction/internal rotation) negative  Hip range of motion: grossly intact  Leg lengths: not tested        X-RAY:  no new images today.    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 Vertical acetabular implant stable alignment to prior. There is eccentric positioning of the femoral head within the acetabulum, grossly stable to prior xrays 10/20/2011. No obvious loosening. Possible osteolysis around the acetabular component. Lumbar and left hip degenerative changes. Degenerative changes of the pubic symphysis.    Right knee 4/17/2017:  No fracture or osseous lesion is seen. There is moderate diffuse joint space loss in the medial compartment and to a somewhat  lesser degree the lateral compartment. There is mild joint space loss in the patellofemoral articulation. Small osteophytes are seen along all the joint margins. No other abnormality is seen.          Impression:   76yo female with :  1) chronic right hip pain, trochanteric pain syndrome  2) chronic right knee pain, primary osteoarthritis.    Plan:  * right knee injection  * right hip trochanteric bursal injection  * return to clinic as needed.        Alcides Laurent M.D., M.S.  Dept. of Orthopaedic Surgery  [facility]    Large Joint Injection/Arthocentesis: R knee joint    Date/Time: 2/12/2021 9:57  AM  Performed by: Sam Almanza PA  Authorized by: Alcides Laurent MD     Indications:  Pain and osteoarthritis  Needle Size:  22 G  Guidance: landmark guided    Approach:  Anteromedial  Location:  Knee      Medications:  80 mg methylPREDNISolone 80 MG/ML; 4 mL bupivacaine 0.25 %  Outcome:  Tolerated well, no immediate complications  Procedure discussed: discussed risks, benefits, and alternatives    Consent Given by:  Patient  Prep: patient was prepped and draped in usual sterile fashion    Large Joint Injection/Arthocentesis: R greater trochanteric bursa    Date/Time: 2/12/2021 9:57 AM  Performed by: Sam Almanza PA  Authorized by: Alcides Laurent MD     Indications:  Pain  Needle Size:  22 G  Approach:  Lateral  Location:  Hip      Site:  R greater trochanteric bursa  Medications:  80 mg methylPREDNISolone 80 MG/ML; 4 mL bupivacaine 0.25 %  Outcome:  Tolerated well, no immediate complications  Procedure discussed: discussed risks, benefits, and alternatives    Consent Given by:  Patient  Prep: patient was prepped and draped in usual sterile fashion

## 2021-04-08 ENCOUNTER — TRANSFERRED RECORDS (OUTPATIENT)
Dept: HEALTH INFORMATION MANAGEMENT | Facility: CLINIC | Age: 76
End: 2021-04-08

## 2021-04-13 ENCOUNTER — TELEPHONE (OUTPATIENT)
Dept: NEUROLOGY | Facility: CLINIC | Age: 76
End: 2021-04-13

## 2021-04-13 NOTE — TELEPHONE ENCOUNTER
Received office notes from [facility]   Records Date of service: 4/8/21  Copy has been sent to scanning and encounter routed to specialty nurse for review.

## 2021-04-21 ENCOUNTER — MYC MEDICAL ADVICE (OUTPATIENT)
Dept: NEUROLOGY | Facility: CLINIC | Age: 76
End: 2021-04-21

## 2021-04-21 PROBLEM — G20.C PARKINSONISM (H): Status: ACTIVE | Noted: 2021-04-21

## 2021-04-22 ENCOUNTER — TRANSFERRED RECORDS (OUTPATIENT)
Dept: HEALTH INFORMATION MANAGEMENT | Facility: CLINIC | Age: 76
End: 2021-04-22

## 2021-04-22 NOTE — TELEPHONE ENCOUNTER
----- Message from Darien Andrade MD sent at 4/21/2021  3:02 PM CDT -----  Regarding: please obtain medication list from care facility for upcoming visit - thanks

## 2021-04-22 NOTE — TELEPHONE ENCOUNTER
Received medication list from [facility], list was emailed to Dr Andrade, and sent to be scanned into patient chart

## 2021-05-04 NOTE — TELEPHONE ENCOUNTER
RECORDS RECEIVED FROM: Self   Date of Appt: 6/17/21   NOTES (FOR ALL VISITS) STATUS DETAILS   OFFICE NOTE from referring provider N/A    OFFICE NOTE from other specialist Care Everywhere Dr Suraj Agarwal @ [facility]:  4/8/21  1/7/21  10/28/20  7/29/20  4/22/20  3/5/20  (addiitonal encounters in Care Everywhere)    Dr Andrade @ [facility]:  8/25/14   DISCHARGE SUMMARY from hospital N/A    DISCHARGE REPORT from the ER N/A    OPERATIVE REPORT N/A    MEDICATION LIST Care Everywhere    IMAGING  (FOR ALL VISITS)     EMG N/A    EEG N/A    LUMBAR PUNCTURE N/A    JYOTI SCAN N/A    ULTRASOUND (CAROTID BILAT) *VASCULAR* N/A    MRI (HEAD, NECK, SPINE) Received [facility]:  MRI Head 4/8/21  MRI Head 1/7/21  MRI Head 10/28/20  MRI Brain 7/29/20  MRI Brain 4/22/20  MRI Head 3/5/20  MRI Head 1/15/20  MRI Head 10/23/19  MRI Brain 2/14/19   CT (HEAD, NECK, SPINE) N/A       Action 5/4/21 MV 11.11am   Action Taken Imaging request faxed to [facility] for:  MRI Head 4/8/21  MRI Head 1/7/21  MRI Head 10/28/20  MRI Brain 7/29/20  MRI Brain 4/22/20  MRI Head 3/5/20  MRI Head 1/15/20  MRI Head 10/23/19  MRI Brain 2/14/19     Action 5/7/21 MV 1.40pm   Action Taken Images received from [facility] and resolved in PACS

## 2021-05-08 ENCOUNTER — HEALTH MAINTENANCE LETTER (OUTPATIENT)
Age: 76
End: 2021-05-08

## 2021-05-14 ENCOUNTER — OFFICE VISIT (OUTPATIENT)
Dept: ORTHOPEDICS | Facility: CLINIC | Age: 76
End: 2021-05-14
Payer: COMMERCIAL

## 2021-05-14 VITALS — SYSTOLIC BLOOD PRESSURE: 156 MMHG | DIASTOLIC BLOOD PRESSURE: 100 MMHG

## 2021-05-14 DIAGNOSIS — M76.31 ILIOTIBIAL BAND TENDINITIS OF RIGHT SIDE: Primary | ICD-10-CM

## 2021-05-14 DIAGNOSIS — M25.551 GREATER TROCHANTERIC PAIN SYNDROME OF RIGHT LOWER EXTREMITY: ICD-10-CM

## 2021-05-14 PROCEDURE — 99213 OFFICE O/P EST LOW 20 MIN: CPT | Performed by: ORTHOPAEDIC SURGERY

## 2021-05-14 RX ORDER — TIZANIDINE 2 MG/1
2 TABLET ORAL 3 TIMES DAILY PRN
Qty: 15 TABLET | Refills: 0 | Status: SHIPPED | OUTPATIENT
Start: 2021-05-14

## 2021-05-14 ASSESSMENT — PAIN SCALES - GENERAL: PAINLEVEL: MILD PAIN (2)

## 2021-05-14 NOTE — LETTER
5/14/2021         RE: Kamila Strauss  [address] [facility] [address]        Dear Colleague,    Thank you for referring your patient, Kamila Strauss, to the [facility]. Please see a copy of my visit note below.    CHIEF COMPLAINT:   Chief Complaint   Patient presents with     Right Hip - Pain     Right distal thigh pain. Had right knee and greater trochanteric bursa cortisone injections on 2/12/21. These didn't offer much relief. Pain is more in distal thigh and at night. Has been relieved by massage and pain cream, somewhat.       HISTORY OF PRESENT ILLNESS    Kamila Strauss is a 75 year old female seen for right hip pain. She had a total hip arthroplasty about 30+ years ago. Pain originates in the distal thigh and radiates to the hip. She had a cortisone injection 2016 for bursitis, which helped, but repeat injection 2/12/2021 didn't. She notes pain more down the thigh. Doesn't have pain sitting, stand, but just with laying in bed. She is mostly nonambulatory, so doesn't really have pain with walking. She has Parkinsons disease. Pain most alleviated by massage and pain cream to some extent.    Also right knee pain. Had injection 11/2017 and 2/12/2021. Pain for years. Locates most pain along the outer aspect.          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), Ataxia (9/15/2013), Back pain, Basal cell carcinoma of anterior chest (8/25/2014), Cataract, Chronic constipation, Chronic low back pain (9/16/2013), Colon polyp, Colon polyp, Deep venous thrombosis (H) (9/16/2013), Diverticulosis, Former smoker (9/16/2013), Gait disorder (8/22/2014), H/O magnetic resonance imaging of brain and brain stem 2021  (4/21/2021), Hip replacement, Hyperlipidemia LDL goal < 130, Hypothyroid, Inflammatory arthritis, Localization-related (focal) (partial) epilepsy and epileptic syndromes with complex partial seizures, with intractable  epilepsy, Localization-related (focal) (partial) epilepsy and epileptic syndromes with complex partial seizures, without mention of intractable epilepsy, Major depressive disorder, recurrent episode, unspecified, Malignant neoplasm of cerebrum, except lobes and ventricles (H), Osteoporosis (7/8/2013), Other convulsions, Parkinsonism (H) (4/21/2021), and Seizure disorder (H). She also has no past medical history of Amblyopia, Bleeding disorder (H), Diabetes mellitus (H), Diabetic retinopathy (H), Glaucoma (increased eye pressure), Heart disease, Hypertension, Kidney disease, Retinal detachment, Senile macular degeneration, Strabismus, Stroke (H), Surgical complications, Type II or unspecified type diabetes mellitus without mention of complication, not stated as uncontrolled, Unspecified asthma(493.90), or Uveitis.   Patient Active Problem List    Diagnosis Date Noted     Parkinsonism (H) 04/21/2021     Priority: Medium     H/O magnetic resonance imaging of brain and brain stem 2021 04/21/2021     Priority: Medium     MR HEAD W WO CONT W PERFUSION1/7/2021  [facility]  Other Result Information   This result has an attachment that is not available.   Result Narrative   INDICATION:    Brain tumor follow up.    TECHNIQUE:  Brain MRI with contrast.    The following sequences were obtained:  DWI and ADC mapping sequences.  Sagittal T1 weighted sequence.  Axial FLAIR and MELE T2 weighted sequences.  3D T1 weighted pre-contrast sequences.  3D T1 weighted post-contrast sequences.  10 cc of Gadavist gadolinium based contrast agent was used.    Dynamic susceptibility perfusion weighted sequences were also obtained.  Maps of relative cerebral blood flow and relative cerebral blood volume were constructed.     COMPARISON:  Multiple priors, the most recent from 07/29/2020.    FINDINGS:     History of astrocytoma, initially grade 2 and treated with radiation only. Anaplastic transformation in 2015,  with resection and postop chemotherapy. More recently, radiation boost performed 03/2020, to the superior right frontal lobe region. Postsurgical changes of remote high right frontoparietal craniotomy underlying resection cavity demonstrated. Cystic encephalomalacia and peripheral hemosiderin staining is present along the resection cavity superiorly. Enhancing nodule within the right posterior centrum semiovale is increased in size compared to prior exam, now measuring to 12 millimeters and previously measuring 7 millimeters. There is associated elevated cerebral blood volume, with a CBV ratio of 2:1. Two small enhancing foci within the nearby right posterior frontal centrum semiovale, series 20/123 are slightly increased in conspicuity. 7 millimeter oblong focus of enhancement within the right posterior frontal subcortical white matter is stable in size compared to the recent prior exam but slightly increased in size compared to more remote exams. It is below the resolution of perfusion imaging. No new enhancing lesions are identified elsewhere within the brain. Patchy ill-defined FLAIR hyperintensity within the supratentorial white matter bilaterally, not significantly changed in likely mostly reflects posttreatment effect. No new sites of FLAIR signal abnormality are identified. No pathologic intracranial diffusion restriction. No new mass effect.     The orbital contents are normal. Large mucous retention cyst right sphenoid sinus. Minimal paranasal sinus mucosal thickening.       IMPRESSION:    1. Slightly increased size of a enhancing tumor nodule within the right posterior frontal centrum semiovale, now measuring up to 12 millimeters. Very slightly increased conspicuity of 2 nearby enhancing foci, also potentially reflecting neoplasm. Stable size of the small right frontal subcortical enhancing focus, which is indeterminate but may also reflect neoplasm. No other significant interval changes.        Dictated  by Homer Chapman MD @ Jan 7 2021  1:03PM    Signed by Dr. Homer Chapman @ Jan 7 2021  1:37PM   Other Result Information   Interface, [employer] - 01/07/2021  1:38 PM CST  INDICATION:    Brain tumor follow up.    TECHNIQUE:  Brain MRI with contrast.    The following sequences were obtained:  DWI and ADC mapping sequences.  Sagittal T1 weighted sequence.  Axial FLAIR and MELE T2 weighted sequences.  3D T1 weighted pre-contrast sequences.  3D T1 weighted post-contrast sequences.  10 cc of Gadavist gadolinium based contrast agent was used.    Dynamic susceptibility perfusion weighted sequences were also obtained.  Maps of relative cerebral blood flow and relative cerebral blood volume were constructed.     COMPARISON:  Multiple priors, the most recent from 07/29/2020.    FINDINGS:     History of astrocytoma, initially grade 2 and treated with radiation only. Anaplastic transformation in 2015, with resection and postop chemotherapy. More recently, radiation boost performed 03/2020, to the superior right frontal lobe region. Postsurgical changes of remote high right frontoparietal craniotomy underlying resection cavity demonstrated. Cystic encephalomalacia and peripheral hemosiderin staining is present along the resection cavity superiorly. Enhancing nodule within the right posterior centrum semiovale is increased in size compared to prior exam, now measuring to 12 millimeters and previously measuring 7 millimeters. There is associated elevated cerebral blood volume, with a CBV ratio of 2:1. Two small enhancing foci within the nearby right posterior frontal centrum semiovale, series 20/123 are slightly increased in conspicuity. 7 millimeter oblong focus of enhancement within the right posterior frontal subcortical white matter is stable in size compared to the recent prior exam but slightly increased in size compared to more remote exams. It is below the resolution of perfusion imaging. No new enhancing lesions are  identified elsewhere within the brain. Patchy ill-defined FLAIR hyperintensity within the supratentorial white matter bilaterally, not significantly changed in likely mostly reflects posttreatment effect. No new sites of FLAIR signal abnormality are identified. No pathologic intracranial diffusion restriction. No new mass effect.     The orbital contents are normal. Large mucous retention cyst right sphenoid sinus. Minimal paranasal sinus mucosal thickening.       IMPRESSION:    1. Slightly increased size of a enhancing tumor nodule within the right posterior frontal centrum semiovale, now measuring up to 12 millimeters. Very slightly increased conspicuity of 2 nearby enhancing foci, also potentially reflecting neoplasm. Stable size of the small right frontal subcortical enhancing focus, which is indeterminate but may also reflect neoplasm. No other significant interval changes.        Dictated by Homer Chapman MD @ Jan 7 2021  1:03PM    Signed by Dr. Homer Chapman @ Jan 7 2021  1:37PM            Meibomian gland dysfunction 01/13/2015     Priority: Medium     Health Care Home (Active) 10/23/2014     Priority: Medium       Care Coordinator:  Rekha Don    See Letters for H Care Plan  Date:  October 23, 2014             Basal cell carcinoma of anterior chest 08/25/2014     Priority: Medium     Abnormal brain MRI 08/25/2014     Priority: Medium     Indication:  Brain tumor, followup.  Astrocytoma.    Comparison:  04/29/2014.    Technique:  Multiplanar T1-T2, FLAIR and diffusion-weighted imaging.  Post gadolinium T1 sequences.    Findings:  Compared to the prior exam, there is a stable mass within the right posterior frontal lobe at the vertex measuring approximately 3.1 x 3.8 cm in transverse and AP dimensions with predominantly low T1 and high T2 signal intensity.  There are internal areas of T2 shortening which may represent degraded blood products or calcification.  Post-contrast imaging again demonstrates  heterogeneous enhancement.  There is stable mild T2 prolongation within the white matter inferior to the mass consistent with edema.    Otherwise, moderate generalized cerebral volume loss.  Patchy areas of T2/FLAIR signal within the periventricular and subcortical white matter which likely represent small vessel ischemic changes.  No acute intracranial hemorrhage.  No abnormal ventricular dilatation.  Intracranial vascular flow voids are preserved.  No mass or mass effect.  No midline shift.  No restricted diffusion to suggest acute ischemia.  No new areas of abnormal enhancement or enhancing lesions.  Bilateral orbits are unremarkable.  Normal appearing sella.  Fluid and mucosal thickening nearly completely opacifies the paranasal sinuses.  The mastoid air cells are clear.    Impression:   1. No interval change.   2. Stable size and appearance of the heterogeneous enhancing tumor in the posterior right frontal lobe at the vertex.  No evidence of progression of tumor.   3. Moderate generalized cerebral volume loss.  Chronic deep white matter small vessel ischemic changes.   4. No acute intracranial abnormality.   5. Sinusitis.    Dictated by Denzel Jacinto MD @ Aug 14 2014 10:08AM    Signed by Dr. Denzel Jacinto @ Aug 14 2014 10:14AM         Gait disorder 08/22/2014     Priority: Medium     Impingement syndrome of right shoulder 10/07/2013     Priority: Medium     Menopause 09/16/2013     Priority: Medium     Vaginal bleeding 7/15/2002 s/p d and c       Malaria 09/16/2013     Priority: Medium     1997 after returning from [place]       Former smoker 09/16/2013     Priority: Medium     Rotator cuff injury 09/16/2013     Priority: Medium     right       Deep venous thrombosis (H) 09/16/2013     Priority: Medium     Chronic low back pain 09/16/2013     Priority: Medium     Pelvic fracture (H) 09/16/2013     Priority: Medium     10/16/2010       Ataxia 09/15/2013     Priority: Medium     Closed fracture of condyle of humerus  07/25/2013     Priority: Medium     Fracture of humerus, condyle, right, closed 07/25/2013     Priority: Medium     Closed fracture lateral condyle humerus 07/22/2013     Priority: Medium     Accidental fall on or from stairs or steps 07/08/2013     Priority: Medium     Osteoporosis 07/08/2013     Priority: Medium     Fall down steps 07/08/2013     Priority: Medium     Vertebral fracture L3 post fall 8/28/2012 09/04/2012     Priority: Medium     Dr Ha in [facility].         Vestibular neuropathy 09/04/2012     Priority: Medium     H/o lower extremity weakness. Chronic use of a walker due to imbalance  rxd with meclizine in 1997       Seizure disorder (H) 09/04/2012     Priority: Medium     History of bilat pulmonary embolism 2010 09/04/2012     Priority: Medium     Previously on coumadin but was discontinued and asa started  Fall risk       Bradycardia, chronic 08/29/2012     Priority: Medium     Asymptomatic  No AV block on ECG  Normal QT interval       Astrocytoma (H) 08/29/2012     Priority: Medium     Managed by Dr Suraj Camara MD [facility].       Encounter for counseling 08/29/2012     Priority: Medium     Overview:   Patient has identified Health Care Agent(s): Yes  Add Health Care Agents: Yes  Spouse Shmuel.  Health Care Agent(s):  Primary Health Care Agent:  Relationship:  Phone:    Secondary Health Care Agent:  Relationship:  Phone:    Conservator:  Relationship:  Phone:    Guardian: Relationship:  Phone:      Patient has Advance Care Plan Documents (Health Care Directive, POLST):     Patient has identified Specific Treatment Preferences: Full       Depressive disorder 08/29/2012     Priority: Medium     Fall 08/29/2012     Priority: Medium     Closed fracture of lumbar vertebra (H) 08/29/2012     Priority: Medium     Falls 08/29/2012     Priority: Medium     ACP (advance care planning) 08/29/2012     Priority: Medium     Formatting of this note might be different from the  original.  Patient has identified Health Care Agent(s): Yes  Add Health Care Agents: Yes  Spouse Shmuel.  Health Care Agent(s):  Primary Health Care Agent:  Relationship:  Phone:    Secondary Health Care Agent:  Relationship:  Phone:    Conservator:  Relationship:  Phone:    Guardian: Relationship:  Phone:      Patient has Advance Care Plan Documents (Health Care Directive, POLST):     Patient has identified Specific Treatment Preferences: Full    Formatting of this note is different from the original.  Patient has identified Health Care Agent(s): Yes  Add Health Care Agents: Yes  Spouse Shmuel.  Health Care Agent(s):  Primary Health Care Agent:  Relationship:  Phone:    Secondary Health Care Agent:  Relationship:  Phone:    Conservator:  Relationship:  Phone:    Guardian: Relationship:  Phone:      Patient has Advance Care Plan Documents (Health Care Directive, POLST):     Patient has identified Specific Treatment Preferences: Full       Depression 08/29/2012     Priority: Medium     Lumbar vertebral fracture (H) 08/29/2012     Priority: Medium     Counseling for marital and partner problems 05/09/2012     Priority: Medium     Problem list name updated by automated process. Provider to review       Partial epilepsy with impairment of consciousness (H) 04/23/2012     Priority: Medium     Problem list name updated by automated process. Provider to review       Falls frequently 04/23/2012     Priority: Medium     Status post right hip replacement 1987 and 1993 10/21/2011     Priority: Medium     Cataract 09/12/2011     Priority: Medium     Utility update for deleted IMO code  Imo Update utility       Hypothyroidism 04/18/2011     Priority: Medium     Advanced directives, counseling/discussion 04/18/2011     Priority: Medium     Patient has completed an Advance/Health Care Directive (HCD), to bring in copy to be scanned into Epic.    Padmini Nichole  April 18, 2011    Reviewed POLST  Signed by provider on 09/28/2012.    Raven  Lara [facility]    Health Care Directive received, reviewed for clarity and legality, and will be scanned into the patient's EMR chart.  Raven Bermudez [facility]  ACP Facilitator             Constipation 04/18/2011     Priority: Medium     Major depressive disorder, recurrent episode (H)      Priority: Medium     Problem list name updated by automated process. Provider to review       Malignant Neoplasm of Cerebrum, 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      Priority: Medium     Grade II astrocytoma diagnosed 1987; s/p biopsy but no resection and 30 XRT  Had left focal sz sept 1997 and focal sz 10/21/2011  Mri brain 9/97, 9/98,. 4/00, 1/30/03 at [facility] and [facility] 2005 shows no change. But recent jan 2013 showed  A change and dr. Camara had done scan July 2013 and another is due in late September or early October 2013.   Last seen neurosurgery 12/97  Followed by dr. Suraj camara 2013       Hyperlipidemia with target LDL less than 130      Priority: Medium     Diagnosis updated by automated process. Provider to review and confirm.       Pulmonary embolism and infarction (H) 09/30/2009     Priority: Medium     Epilepsy (H) 09/30/2009     Priority: Medium       Surgical Hx:  has a past surgical history that includes COLONOSCOPY THRU STOMA W BIOPSY/CAUTERY TUMOR/POLYP/LESION; RECONSTRUC HIP SOCKET; TOTAL KNEE ARTHROPLASTY; and biopsy (8/04/15).    Medications:   Current Outpatient Medications:      acetaminophen (ACETAMINOPHEN 8 HOUR) 650 MG CR tablet, Take 650 mg by mouth every 6 hours as needed for mild pain or fever, Disp: , Rfl:      acetaminophen (TYLENOL) 500 MG tablet, Take 2-3 tablets by mouth every 6 hours as needed. 4 tabs qhs prn , Disp: , Rfl:      albuterol (PROVENTIL) (2.5 MG/3ML) 0.083% neb solution, , Disp: , Rfl:      ASPIRIN 81 MG OR TABS, 1 TABLET DAILY*, Disp: , Rfl:      CALCIUM + D OR, 1 TABLET DAILY, Disp: , Rfl:      calcium carbonate (OS-MIKE 600 MG [place]. [place]) 600 MG TABS tablet, Take  1,000 mg by mouth daily, Disp: , Rfl:      carbidopa-levodopa (SINEMET CR)  MG CR tablet, Take 2 tablets by mouth 3 times daily, Disp: , Rfl:      carbidopa-levodopa (SINEMET CR)  MG per tablet, Take 1 tablet by mouth At Bedtime, Disp: 90 tablet, Rfl: 1     carbidopa-levodopa (SINEMET)  MG per tablet, Take 2 tablets by mouth 3 times daily Taking 7am,11am,4pm, Disp: 180 tablet, Rfl: 5     cholecalciferol (VITAMIN D) 1000 UNIT tablet, 1000 units daily, Disp: 30 tablet, Rfl:      Cholecalciferol (VITAMIN D3) 50 MCG (2000 UT) CAPS, , Disp: , Rfl:      cyclobenzaprine (FLEXERIL) 5 MG tablet, , Disp: , Rfl:      dexamethasone (DECADRON) 2 MG tablet, , Disp: , Rfl:      diazepam (VALIUM) 5 MG tablet, 5mg tab by mouth prior to mri scan, Disp: , Rfl:      FISH OIL, 3 tablets daily., Disp: , Rfl:      fluocinonide (LIDEX) 0.05 % cream, Apply  topically 2 times daily., Disp: 45 g, Rfl: 0     furosemide (LASIX) 20 MG tablet, Take 20 mg by mouth, Disp: , Rfl:      HYDROcodone-acetaminophen (NORCO) 5-325 MG per tablet, Take 1 tablet by mouth every 4 hours as needed for moderate to severe pain , Disp: , Rfl:      hydrocortisone (CORTAID) 1 % external cream, Apply topically 3 times daily as needed, Disp: , Rfl:      hydrocortisone valerate (WEST-SIENNA) 0.2 % ointment, Apply topically 2 times daily Use up to two weeks then on an as needed basis to affected areas on face., Disp: 60 g, Rfl: 0     ibuprofen (ADVIL/MOTRIN) 200 MG tablet, , Disp: , Rfl:      lacosamide (VIMPAT) 50 MG TABS, 50 mg daily DOSE UNKNOWN; 1 tab in the morning and 1 tab in the evening, Disp: 60 tablet, Rfl: 11     lamoTRIgine (LAMICTAL) 100 MG tablet, Take 200 mg by mouth 2 times daily, Disp: , Rfl:      lamoTRIgine (LAMICTAL) 200 MG tablet, , Disp: , Rfl:      levetiracetam (KEPPRA) 500 MG tablet, Take 500 mg by mouth 2 times daily., Disp: , Rfl:      levetiracetam (KEPPRA) 750 MG tablet, Take 750 mg by mouth 2 times daily., Disp: , Rfl:       levothyroxine (SYNTHROID, LEVOTHROID) 112 MCG tablet, Take 1 tablet (112 mcg) by mouth daily, Disp: 90 tablet, Rfl: 1     MAGNESIUM OR, 1 TABLET DAILY, Disp: , Rfl:      naproxen (NAPROSYN) 500 MG tablet, , Disp: , Rfl:      NYSTOP 582215 UNIT/GM powder, Apply topically 2 times daily as needed , Disp: , Rfl:      Omega-3 Fatty Acids (FISH OIL) 1200 MG capsule, Take 1,200 mg by mouth 2 times daily , Disp: , Rfl:      Ondansetron HCl (ZOFRAN PO), Take 8 mg by mouth, Disp: , Rfl:      ORDER FOR DME, Equipment being ordered: Wheelchair, Disp: 1 Device, Rfl: 1     ORDER FOR DME, Equipment being ordered: Commode seat lift without handles., Disp: 1 each, Rfl: 0     oxyCODONE (ROXICODONE) 5 MG IR tablet, , Disp: , Rfl:      pentoxifylline (TRENTAL) 400 MG CR tablet, Take 400 mg by mouth 3 times daily (with meals), Disp: , Rfl:      PHENAZOPYRIDINE HCL PO, Take by mouth 3 times daily as needed for urinary tract discomfort, Disp: , Rfl:      polyethylene glycol (MIRALAX/GLYCOLAX) packet, Take 17 g by mouth daily as needed, Disp: , Rfl:      PROAIR  (90 Base) MCG/ACT inhaler, , Disp: , Rfl:      senna-docusate (SENOKOT-S;PERICOLACE) 8.6-50 MG per tablet, Take 4 tablets by mouth 2 times daily , Disp: , Rfl:      sertraline (ZOLOFT) 100 MG tablet, Take 1.5 tablets (150 mg) by mouth daily, Disp: 135 tablet, Rfl: 3     sertraline (ZOLOFT) 50 MG tablet, Take 150 mg by mouth daily, Disp: , Rfl:      tiZANidine (ZANAFLEX) 2 MG tablet, Take 1 tablet (2 mg) by mouth 3 times daily as needed for muscle spasms or other (pain), Disp: 15 tablet, Rfl: 0     UNABLE TO FIND, MEDICATION NAME: Uristat (phenazopyridine) 190 mg oral, three times a day PRN, Disp: , Rfl:      vitamin C (ASCORBIC ACID) 500 MG tablet, , Disp: , Rfl:      vitamin E 400 UNIT capsule, Take 1 capsule (400 Units) by mouth daily, Disp: 30 capsule, Rfl:      zinc gluconate 50 MG tablet, , Disp: , Rfl:      Zinc Sulfate 220 (50 Zn) MG TABS, Take 220 mg by mouth daily ,  Disp: , Rfl:     Current Facility-Administered Medications:      bupivacaine (MARCAINE) 0.25 % injection 4 mL, 4 mL, , , Alcides Laurent MD, 4 mL at 02/12/21 0957     bupivacaine (MARCAINE) 0.25 % injection 4 mL, 4 mL, , , Alcides Laurent MD, 4 mL at 02/12/21 0957     methylPREDNISolone (DEPO-MEDROL) injection 80 mg, 80 mg, , , Alcides Laurent MD, 80 mg at 02/12/21 0957     methylPREDNISolone (DEPO-MEDROL) injection 80 mg, 80 mg, , , Alcides Laurent MD, 80 mg at 02/12/21 0957    Allergies:   Allergies   Allergen Reactions     Nickel Rash     Hmg-Coa-R Inhibitors Other (See Comments) and Muscle Pain (Myalgia)     Muscle aches  Muscle aches  Muscle aches     Simvastatin Other (See Comments)     Leg Cramps       Social Hx: . Living in nursing facility.   reports that she quit smoking about 11 years ago. She has never used smokeless tobacco. She reports current alcohol use. She reports that she does not use drugs.    Family Hx: family history includes Cancer in her father; Family History Negative in an other family member; Thyroid Disease in her mother.    REVIEW OF SYSTEMS:   CONSTITUTIONAL:NEGATIVE for fever, chills, change in weight  INTEGUMENTARY/SKIN: NEGATIVE for worrisome rashes, moles or lesions  MUSCULOSKELETAL:See HPI above  NEURO: NEGATIVE for weakness, dizziness or paresthesias    PHYSICAL EXAM:  BP (!) 156/100    GENERAL APPEARANCE: healthy, alert, no distress; accompanied by her son.  SKIN: no suspicious lesions or rashes  NEURO: decreased strength and tone, mentation intact and speech normal; left upper extremity tremor noted.  PSYCH:  mentation appears normal and affect normal, not anxious  RESPIRATORY: No increased work of breathing.    BILATERAL LOWER EXTREMITIES:  Gait: not assessed, in wheelchair  No gross deformities or masses.  Bilateral Quad atrophy, weak  Intact sensation deep peroneal nerve, superficial peroneal nerve, med/lat tibial nerve, sural nerve, saphenous nerve  Intact  EHL, EDL, TA, FHL, GS, quadriceps hamstrings and hip flexors  Toes warm and well perfused, brisk capillary refill. Palpable 2+ dp pulses.  Bilateral calf soft and nttp or squeeze.  Edema: none      RIGHT KNEE EXAM:    Skin: intact, no ecchymosis or erythema  ROM: 5 extension to 115 flexion  Tight hamstrings on straight leg raise.  Effusion: none  Tender: medial joint line, lateral joint line  nontender to palpation anterior or posterior knee    MCL: stable, and non-painful at both 0 and 30 degrees knee flexion  Varus stress: stable, and non-painful at both 0 and 30 degrees knee flexion  Lachmans: neg, firm endpoint  Posterior Drawer stable  Patellofemoral joint:                Apprehension: negative              Crepitations: positive   Grind: positive    RIGHT HIP EXAM:    Palpation: Tender:   right greater trochanter, right gluteus medius, right IT band from hip to the knee, right adductors  Special tests:  Irritability (flexion/adduction/internal rotation) negative  Hip range of motion: grossly intact  Leg lengths: not tested        X-RAY:  no new images today.    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 Vertical acetabular implant stable alignment to prior. There is eccentric positioning of the femoral head within the acetabulum, grossly stable to prior xrays 10/20/2011. No obvious loosening. Possible osteolysis around the acetabular component. Lumbar and left hip degenerative changes. Degenerative changes of the pubic symphysis.    Right knee 4/17/2017:  No fracture or osseous lesion is seen. There is moderate diffuse joint space loss in the medial compartment and to a somewhat  lesser degree the lateral compartment. There is mild joint space loss in the patellofemoral articulation. Small osteophytes are seen along all the joint margins. No other abnormality is seen.          Impression:   74yo female with :  1) chronic right hip pain, trochanteric pain  syndrome  2) chronic right knee pain, primary osteoarthritis.    Plan:  * might be more of the iliotibial band and thigh muscles, could try a muscle relaxant; tizanidine prescription printed out for her today.  * not sure if repeat injections will be of benefit because its not clear the previous injections from 3 months ago really helped at all, but history is unclear.  * recommend Physical Therapy for iliotibial band stretching / massage / rolling and core exercises and gait training  * return to clinic as needed.          Alcides Laurent M.D., M.S.  Dept. of Orthopaedic Surgery  [facility]    Procedures        Again, thank you for allowing me to participate in the care of your patient.        Sincerely,        Alcides Laurent MD

## 2021-05-14 NOTE — PROGRESS NOTES
CHIEF COMPLAINT:   Chief Complaint   Patient presents with     Right Hip - Pain     Right distal thigh pain. Had right knee and greater trochanteric bursa cortisone injections on 2/12/21. These didn't offer much relief. Pain is more in distal thigh and at night. Has been relieved by massage and pain cream, somewhat.       HISTORY OF PRESENT ILLNESS    Kamila Strauss is a 75 year old female seen for right hip pain. She had a total hip arthroplasty about 30+ years ago. Pain originates in the distal thigh and radiates to the hip. She had a cortisone injection 2016 for bursitis, which helped, but repeat injection 2/12/2021 didn't. She notes pain more down the thigh. Doesn't have pain sitting, stand, but just with laying in bed. She is mostly nonambulatory, so doesn't really have pain with walking. She has Parkinsons disease. Pain most alleviated by massage and pain cream to some extent.    Also right knee pain. Had injection 11/2017 and 2/12/2021. Pain for years. Locates most pain along the outer aspect.          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), Ataxia (9/15/2013), Back pain, Basal cell carcinoma of anterior chest (8/25/2014), Cataract, Chronic constipation, Chronic low back pain (9/16/2013), Colon polyp, Colon polyp, Deep venous thrombosis (H) (9/16/2013), Diverticulosis, Former smoker (9/16/2013), Gait disorder (8/22/2014), H/O magnetic resonance imaging of brain and brain stem 2021  (4/21/2021), Hip replacement, Hyperlipidemia LDL goal < 130, Hypothyroid, Inflammatory arthritis, Localization-related (focal) (partial) epilepsy and epileptic syndromes with complex partial seizures, with intractable epilepsy, Localization-related (focal) (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 She also has no past medical history of Amblyopia, Bleeding disorder (H), Diabetes mellitus (H), Diabetic retinopathy (H), Glaucoma (increased eye pressure), Heart disease, Hypertension, Kidney disease, Retinal detachment, Senile macular degeneration, Strabismus, Stroke (H), Surgical complications, Type II or unspecified type diabetes mellitus without mention of complication, not stated as uncontrolled, Unspecified asthma(493.90), or Uveitis.   Patient Active Problem List    Diagnosis Date Noted     Parkinsonism (H) 04/21/2021     Priority: Medium     H/O magnetic resonance imaging of brain and brain stem 2021 04/21/2021     Priority: Medium     MR HEAD W WO CONT W PERFUSION1[facility]7/2021  [facility]  Other Result Information   This result has an attachment that is not available.   Result Narrative   INDICATION:    Brain tumor follow up.    TECHNIQUE:  Brain MRI with contrast.    The following sequences were obtained:  DWI and ADC mapping sequences.  Sagittal T1 weighted sequence.  Axial FLAIR and MELE T2 weighted sequences.  3D T1 weighted pre-contrast sequences.  3D T1 weighted post-contrast sequences.  10 cc of Gadavist gadolinium based contrast agent was used.    Dynamic susceptibility perfusion weighted sequences were also obtained.  Maps of relative cerebral blood flow and relative cerebral blood volume were constructed.     COMPARISON:  Multiple priors, the most recent from 07/29/2020.    FINDINGS:     History of astrocytoma, initially grade 2 and treated with radiation only. Anaplastic transformation in 2015, with resection and postop chemotherapy. More recently, radiation boost performed 03/2020, to the superior right frontal lobe region. Postsurgical changes of remote high right frontoparietal craniotomy underlying resection cavity demonstrated. Cystic  encephalomalacia and peripheral hemosiderin staining is present along the resection cavity superiorly. Enhancing nodule within the right posterior centrum semiovale is increased in size compared to prior exam, now measuring to 12 millimeters and previously measuring 7 millimeters. There is associated elevated cerebral blood volume, with a CBV ratio of 2:1. Two small enhancing foci within the nearby right posterior frontal centrum semiovale, series 20/123 are slightly increased in conspicuity. 7 millimeter oblong focus of enhancement within the right posterior frontal subcortical white matter is stable in size compared to the recent prior exam but slightly increased in size compared to more remote exams. It is below the resolution of perfusion imaging. No new enhancing lesions are identified elsewhere within the brain. Patchy ill-defined FLAIR hyperintensity within the supratentorial white matter bilaterally, not significantly changed in likely mostly reflects posttreatment effect. No new sites of FLAIR signal abnormality are identified. No pathologic intracranial diffusion restriction. No new mass effect.     The orbital contents are normal. Large mucous retention cyst right sphenoid sinus. Minimal paranasal sinus mucosal thickening.       IMPRESSION:    1. Slightly increased size of a enhancing tumor nodule within the right posterior frontal centrum semiovale, now measuring up to 12 millimeters. Very slightly increased conspicuity of 2 nearby enhancing foci, also potentially reflecting neoplasm. Stable size of the small right frontal subcortical enhancing focus, which is indeterminate but may also reflect neoplasm. No other significant interval changes.        Dictated by Homer Chapman MD @ Jan 7 2021  1:03PM    Signed by Dr. Homer Chapman @ Jan 7 2021  1:37PM   Other Result Information   Interface, [employer] - 01/07/2021  1:38 PM CST  INDICATION:    Brain tumor follow up.    TECHNIQUE:  Brain MRI with  contrast.    The following sequences were obtained:  DWI and ADC mapping sequences.  Sagittal T1 weighted sequence.  Axial FLAIR and MELE T2 weighted sequences.  3D T1 weighted pre-contrast sequences.  3D T1 weighted post-contrast sequences.  10 cc of Gadavist gadolinium based contrast agent was used.    Dynamic susceptibility perfusion weighted sequences were also obtained.  Maps of relative cerebral blood flow and relative cerebral blood volume were constructed.     COMPARISON:  Multiple priors, the most recent from 07/29/2020.    FINDINGS:     History of astrocytoma, initially grade 2 and treated with radiation only. Anaplastic transformation in 2015, with resection and postop chemotherapy. More recently, radiation boost performed 03/2020, to the superior right frontal lobe region. Postsurgical changes of remote high right frontoparietal craniotomy underlying resection cavity demonstrated. Cystic encephalomalacia and peripheral hemosiderin staining is present along the resection cavity superiorly. Enhancing nodule within the right posterior centrum semiovale is increased in size compared to prior exam, now measuring to 12 millimeters and previously measuring 7 millimeters. There is associated elevated cerebral blood volume, with a CBV ratio of 2:1. Two small enhancing foci within the nearby right posterior frontal centrum semiovale, series 20/123 are slightly increased in conspicuity. 7 millimeter oblong focus of enhancement within the right posterior frontal subcortical white matter is stable in size compared to the recent prior exam but slightly increased in size compared to more remote exams. It is below the resolution of perfusion imaging. No new enhancing lesions are identified elsewhere within the brain. Patchy ill-defined FLAIR hyperintensity within the supratentorial white matter bilaterally, not significantly changed in likely mostly reflects posttreatment effect. No new sites of FLAIR signal abnormality  are identified. No pathologic intracranial diffusion restriction. No new mass effect.     The orbital contents are normal. Large mucous retention cyst right sphenoid sinus. Minimal paranasal sinus mucosal thickening.       IMPRESSION:    1. Slightly increased size of a enhancing tumor nodule within the right posterior frontal centrum semiovale, now measuring up to 12 millimeters. Very slightly increased conspicuity of 2 nearby enhancing foci, also potentially reflecting neoplasm. Stable size of the small right frontal subcortical enhancing focus, which is indeterminate but may also reflect neoplasm. No other significant interval changes.        Dictated by Homer Chapman MD @ Jan 7 2021  1:03PM    Signed by Dr. Homer Chapman @ Jan 7 2021  1:37PM            Meibomian gland dysfunction 01/13/2015     Priority: Medium     Health Care Home (Active) 10/23/2014     Priority: Medium       Care Coordinator:  Rekha Don    See Letters for [facility] Care Plan  Date:  October 23, 2014             Basal cell carcinoma of anterior chest 08/25/2014     Priority: Medium     Abnormal brain MRI 08/25/2014     Priority: Medium     Indication:  Brain tumor, followup.  Astrocytoma.    Comparison:  04/29/2014.    Technique:  Multiplanar T1-T2, FLAIR and diffusion-weighted imaging.  Post gadolinium T1 sequences.    Findings:  Compared to the prior exam, there is a stable mass within the right posterior frontal lobe at the vertex measuring approximately 3.1 x 3.8 cm in transverse and AP dimensions with predominantly low T1 and high T2 signal intensity.  There are internal areas of T2 shortening which may represent degraded blood products or calcification.  Post-contrast imaging again demonstrates heterogeneous enhancement.  There is stable mild T2 prolongation within the white matter inferior to the mass consistent with edema.    Otherwise, moderate generalized cerebral volume loss.  Patchy areas of T2/FLAIR signal within the  periventricular and subcortical white matter which likely represent small vessel ischemic changes.  No acute intracranial hemorrhage.  No abnormal ventricular dilatation.  Intracranial vascular flow voids are preserved.  No mass or mass effect.  No midline shift.  No restricted diffusion to suggest acute ischemia.  No new areas of abnormal enhancement or enhancing lesions.  Bilateral orbits are unremarkable.  Normal appearing sella.  Fluid and mucosal thickening nearly completely opacifies the paranasal sinuses.  The mastoid air cells are clear.    Impression:   1. No interval change.   2. Stable size and appearance of the heterogeneous enhancing tumor in the posterior right frontal lobe at the vertex.  No evidence of progression of tumor.   3. Moderate generalized cerebral volume loss.  Chronic deep white matter small vessel ischemic changes.   4. No acute intracranial abnormality.   5. Sinusitis.    Dictated by Denzel Jacinto MD @ Aug 14 2014 10:08AM    Signed by Dr. Denzel Jacinto @ Aug 14 2014 10:14AM         Gait disorder 08/22/2014     Priority: Medium     Impingement syndrome of right shoulder 10/07/2013     Priority: Medium     Menopause 09/16/2013     Priority: Medium     Vaginal bleeding 7/15/2002 s/p d and c       Malaria 09/16/2013     Priority: Medium     1997 after returning from [place]       Former smoker 09/16/2013     Priority: Medium     Rotator cuff injury 09/16/2013     Priority: Medium     right       Deep venous thrombosis (H) 09/16/2013     Priority: Medium     Chronic low back pain 09/16/2013     Priority: Medium     Pelvic fracture (H) 09/16/2013     Priority: Medium     10/16/2010       Ataxia 09/15/2013     Priority: Medium     Closed fracture of condyle of humerus 07/25/2013     Priority: Medium     Fracture of humerus, condyle, right, closed 07/25/2013     Priority: Medium     Closed fracture lateral condyle humerus 07/22/2013     Priority: Medium     Accidental fall on or from stairs or steps  07/08/2013     Priority: Medium     Osteoporosis 07/08/2013     Priority: Medium     Fall down steps 07/08/2013     Priority: Medium     Vertebral fracture L3 post fall 8/28/2012 09/04/2012     Priority: Medium     Dr Ha in [facility].         Vestibular neuropathy 09/04/2012     Priority: Medium     H/o lower extremity weakness. Chronic use of a walker due to imbalance  rxd with meclizine in 1997       Seizure disorder (H) 09/04/2012     Priority: Medium     History of bilat pulmonary embolism 2010 09/04/2012     Priority: Medium     Previously on coumadin but was discontinued and asa started  Fall risk       Bradycardia, chronic 08/29/2012     Priority: Medium     Asymptomatic  No AV block on ECG  Normal QT interval       Astrocytoma (H) 08/29/2012     Priority: Medium     Managed by Dr Suraj Camara MD [facility].       Encounter for counseling 08/29/2012     Priority: Medium     Overview:   Patient has identified Health Care Agent(s): Yes  Add Health Care Agents: Yes  Spouse Shmuel.  Health Care Agent(s):  Primary Health Care Agent:  Relationship:  Phone:    Secondary Health Care Agent:  Relationship:  Phone:    Conservator:  Relationship:  Phone:    Guardian: Relationship:  Phone:      Patient has Advance Care Plan Documents (Health Care Directive, POLST):     Patient has identified Specific Treatment Preferences: Full       Depressive disorder 08/29/2012     Priority: Medium     Fall 08/29/2012     Priority: Medium     Closed fracture of lumbar vertebra (H) 08/29/2012     Priority: Medium     Falls 08/29/2012     Priority: Medium     ACP (advance care planning) 08/29/2012     Priority: Medium     Formatting of this note might be different from the original.  Patient has identified Health Care Agent(s): Yes  Add Health Care Agents: Yes  Spouse Shmuel.  Health Care Agent(s):  Primary Health Care Agent:  Relationship:  Phone:    Secondary Health Care Agent:  Relationship:  Phone:     Conservator:  Relationship:  Phone:    Guardian: Relationship:  Phone:      Patient has Advance Care Plan Documents (Health Care Directive, POLST):     Patient has identified Specific Treatment Preferences: Full    Formatting of this note is different from the original.  Patient has identified Health Care Agent(s): Yes  Add Health Care Agents: Yes  Spouse Shmuel.  Health Care Agent(s):  Primary Health Care Agent:  Relationship:  Phone:    Secondary Health Care Agent:  Relationship:  Phone:    Conservator:  Relationship:  Phone:    Guardian: Relationship:  Phone:      Patient has Advance Care Plan Documents (Health Care Directive, POLST):     Patient has identified Specific Treatment Preferences: Full       Depression 08/29/2012     Priority: Medium     Lumbar vertebral fracture (H) 08/29/2012     Priority: Medium     Counseling for marital and partner problems 05/09/2012     Priority: Medium     Problem list name updated by automated process. Provider to review       Partial epilepsy with impairment of consciousness (H) 04/23/2012     Priority: Medium     Problem list name updated by automated process. Provider to review       Falls frequently 04/23/2012     Priority: Medium     Status post right hip replacement 1987 and 1993 10/21/2011     Priority: Medium     Cataract 09/12/2011     Priority: Medium     Utility update for deleted IMO code  Imo Update utility       Hypothyroidism 04/18/2011     Priority: Medium     Advanced directives, counseling/discussion 04/18/2011     Priority: Medium     Patient has completed an Advance/Health Care Directive (HCD), to bring in copy to be scanned into [employer].    Padmini Nichole  April 18, 2011    Reviewed POLST  Signed by provider on 09/28/2012.    Raven Bermudez CMA    Health Care Directive received, reviewed for clarity and legality, and will be scanned into the patient's EMR chart.  Raven Bermudez CMA  ACP Facilitator             Constipation 04/18/2011     Priority: Medium      Major depressive disorder, recurrent episode (H)      Priority: Medium     Problem list name updated by automated process. Provider to review       Malignant Neoplasm of Cerebrum, 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      Priority: Medium     Grade II astrocytoma diagnosed 1987; s/p biopsy but no resection and 30 XRT  Had left focal sz sept 1997 and focal sz 10/21/2011  Mri brain 9/97, 9/98,. 4/00, 1/30/03 at [place] [facility] 2005 shows no change. But recent jan 2013 showed  A change and dr. Camara had done scan July 2013 and another is due in late September or early October 2013.   Last seen neurosurgery 12/97  Followed by dr. Suraj camara 2013       Hyperlipidemia with target LDL less than 130      Priority: Medium     Diagnosis updated by automated process. Provider to review and confirm.       Pulmonary embolism and infarction (H) 09/30/2009     Priority: Medium     Epilepsy (H) 09/30/2009     Priority: Medium       Surgical Hx:  has a past surgical history that includes COLONOSCOPY THRU STOMA W BIOPSY/CAUTERY TUMOR/POLYP/LESION; RECONSTRUC HIP SOCKET; TOTAL KNEE ARTHROPLASTY; and biopsy (8/04/15).    Medications:   Current Outpatient Medications:      acetaminophen (ACETAMINOPHEN 8 HOUR) 650 MG CR tablet, Take 650 mg by mouth every 6 hours as needed for mild pain or fever, Disp: , Rfl:      acetaminophen (TYLENOL) 500 MG tablet, Take 2-3 tablets by mouth every 6 hours as needed. 4 tabs qhs prn , Disp: , Rfl:      albuterol (PROVENTIL) (2.5 MG/3ML) 0.083% neb solution, , Disp: , Rfl:      ASPIRIN 81 MG OR TABS, 1 TABLET DAILY*, Disp: , Rfl:      CALCIUM + D OR, 1 TABLET DAILY, Disp: , Rfl:      calcium carbonate (OS-MIKE 600 MG [place]. [place]) 600 MG TABS tablet, Take 1,000 mg by mouth daily, Disp: , Rfl:      carbidopa-levodopa (SINEMET CR)  MG CR tablet, Take 2 tablets by mouth 3 times daily, Disp: , Rfl:      carbidopa-levodopa (SINEMET CR)  MG per tablet, Take 1 tablet by mouth  At Bedtime, Disp: 90 tablet, Rfl: 1     carbidopa-levodopa (SINEMET)  MG per tablet, Take 2 tablets by mouth 3 times daily Taking 7am,11am,4pm, Disp: 180 tablet, Rfl: 5     cholecalciferol (VITAMIN D) 1000 UNIT tablet, 1000 units daily, Disp: 30 tablet, Rfl:      Cholecalciferol (VITAMIN D3) 50 MCG (2000 UT) CAPS, , Disp: , Rfl:      cyclobenzaprine (FLEXERIL) 5 MG tablet, , Disp: , Rfl:      dexamethasone (DECADRON) 2 MG tablet, , Disp: , Rfl:      diazepam (VALIUM) 5 MG tablet, 5mg tab by mouth prior to mri scan, Disp: , Rfl:      FISH OIL, 3 tablets daily., Disp: , Rfl:      fluocinonide (LIDEX) 0.05 % cream, Apply  topically 2 times daily., Disp: 45 g, Rfl: 0     furosemide (LASIX) 20 MG tablet, Take 20 mg by mouth, Disp: , Rfl:      HYDROcodone-acetaminophen (NORCO) 5-325 MG per tablet, Take 1 tablet by mouth every 4 hours as needed for moderate to severe pain , Disp: , Rfl:      hydrocortisone (CORTAID) 1 % external cream, Apply topically 3 times daily as needed, Disp: , Rfl:      hydrocortisone valerate (WEST-SIENNA) 0.2 % ointment, Apply topically 2 times daily Use up to two weeks then on an as needed basis to affected areas on face., Disp: 60 g, Rfl: 0     ibuprofen (ADVIL/MOTRIN) 200 MG tablet, , Disp: , Rfl:      lacosamide (VIMPAT) 50 MG TABS, 50 mg daily DOSE UNKNOWN; 1 tab in the morning and 1 tab in the evening, Disp: 60 tablet, Rfl: 11     lamoTRIgine (LAMICTAL) 100 MG tablet, Take 200 mg by mouth 2 times daily, Disp: , Rfl:      lamoTRIgine (LAMICTAL) 200 MG tablet, , Disp: , Rfl:      levetiracetam (KEPPRA) 500 MG tablet, Take 500 mg by mouth 2 times daily., Disp: , Rfl:      levetiracetam (KEPPRA) 750 MG tablet, Take 750 mg by mouth 2 times daily., Disp: , Rfl:      levothyroxine (SYNTHROID, LEVOTHROID) 112 MCG tablet, Take 1 tablet (112 mcg) by mouth daily, Disp: 90 tablet, Rfl: 1     MAGNESIUM OR, 1 TABLET DAILY, Disp: , Rfl:      naproxen (NAPROSYN) 500 MG tablet, , Disp: , Rfl:      NYSTOP  898271 UNIT/GM powder, Apply topically 2 times daily as needed , Disp: , Rfl:      Omega-3 Fatty Acids (FISH OIL) 1200 MG capsule, Take 1,200 mg by mouth 2 times daily , Disp: , Rfl:      Ondansetron HCl (ZOFRAN PO), Take 8 mg by mouth, Disp: , Rfl:      ORDER FOR DME, Equipment being ordered: Wheelchair, Disp: 1 Device, Rfl: 1     ORDER FOR DME, Equipment being ordered: Commode seat lift without handles., Disp: 1 each, Rfl: 0     oxyCODONE (ROXICODONE) 5 MG IR tablet, , Disp: , Rfl:      pentoxifylline (TRENTAL) 400 MG CR tablet, Take 400 mg by mouth 3 times daily (with meals), Disp: , Rfl:      PHENAZOPYRIDINE HCL PO, Take by mouth 3 times daily as needed for urinary tract discomfort, Disp: , Rfl:      polyethylene glycol (MIRALAX/GLYCOLAX) packet, Take 17 g by mouth daily as needed, Disp: , Rfl:      PROAIR  (90 Base) MCG/ACT inhaler, , Disp: , Rfl:      senna-docusate (SENOKOT-S;PERICOLACE) 8.6-50 MG per tablet, Take 4 tablets by mouth 2 times daily , Disp: , Rfl:      sertraline (ZOLOFT) 100 MG tablet, Take 1.5 tablets (150 mg) by mouth daily, Disp: 135 tablet, Rfl: 3     sertraline (ZOLOFT) 50 MG tablet, Take 150 mg by mouth daily, Disp: , Rfl:      tiZANidine (ZANAFLEX) 2 MG tablet, Take 1 tablet (2 mg) by mouth 3 times daily as needed for muscle spasms or other (pain), Disp: 15 tablet, Rfl: 0     UNABLE TO FIND, MEDICATION NAME: Uristat (phenazopyridine) 190 mg oral, three times a day PRN, Disp: , Rfl:      vitamin C (ASCORBIC ACID) 500 MG tablet, , Disp: , Rfl:      vitamin E 400 UNIT capsule, Take 1 capsule (400 Units) by mouth daily, Disp: 30 capsule, Rfl:      zinc gluconate 50 MG tablet, , Disp: , Rfl:      Zinc Sulfate 220 (50 Zn) MG TABS, Take 220 mg by mouth daily , Disp: , Rfl:     Current Facility-Administered Medications:      bupivacaine (MARCAINE) 0.25 % injection 4 mL, 4 mL, , , Alcides Laurent MD, 4 mL at 02/12/21 0957     bupivacaine (MARCAINE) 0.25 % injection 4 mL, 4 mL, , ,  Alcides Laurent MD, 4 mL at 02/12/21 0957     methylPREDNISolone (DEPO-MEDROL) injection 80 mg, 80 mg, , , Alcides Laurent MD, 80 mg at 02/12/21 0957     methylPREDNISolone (DEPO-MEDROL) injection 80 mg, 80 mg, , , Alcides Laurent MD, 80 mg at 02/12/21 0957    Allergies:   Allergies   Allergen Reactions     Nickel Rash     Hmg-Coa-R Inhibitors Other (See Comments) and Muscle Pain (Myalgia)     Muscle aches  Muscle aches  Muscle aches     Simvastatin Other (See Comments)     Leg Cramps       Social Hx: . Living in nursing facility.   reports that she quit smoking about 11 years ago. She has never used smokeless tobacco. She reports current alcohol use. She reports that she does not use drugs.    Family Hx: family history includes Cancer in her father; Family History Negative in an other family member; Thyroid Disease in her mother.    REVIEW OF SYSTEMS:   CONSTITUTIONAL:NEGATIVE for fever, chills, change in weight  INTEGUMENTARY/SKIN: NEGATIVE for worrisome rashes, moles or lesions  MUSCULOSKELETAL:See HPI above  NEURO: NEGATIVE for weakness, dizziness or paresthesias    PHYSICAL EXAM:  BP (!) 156/100    GENERAL APPEARANCE: healthy, alert, no distress; accompanied by her son.  SKIN: no suspicious lesions or rashes  NEURO: decreased strength and tone, mentation intact and speech normal; left upper extremity tremor noted.  PSYCH:  mentation appears normal and affect normal, not anxious  RESPIRATORY: No increased work of breathing.    BILATERAL LOWER EXTREMITIES:  Gait: not assessed, in wheelchair  No gross deformities or masses.  Bilateral Quad atrophy, weak  Intact sensation deep peroneal nerve, superficial peroneal nerve, med/lat tibial nerve, sural nerve, saphenous nerve  Intact EHL, EDL, TA, FHL, GS, quadriceps hamstrings and hip flexors  Toes warm and well perfused, brisk capillary refill. Palpable 2+ dp pulses.  Bilateral calf soft and nttp or squeeze.  Edema: none      RIGHT KNEE EXAM:    Skin:  intact, no ecchymosis or erythema  ROM: 5 extension to 115 flexion  Tight hamstrings on straight leg raise.  Effusion: none  Tender: medial joint line, lateral joint line  nontender to palpation anterior or posterior knee    MCL: stable, and non-painful at both 0 and 30 degrees knee flexion  Varus stress: stable, and non-painful at both 0 and 30 degrees knee flexion  Lachmans: neg, firm endpoint  Posterior Drawer stable  Patellofemoral joint:                Apprehension: negative              Crepitations: positive   Grind: positive    RIGHT HIP EXAM:    Palpation: Tender:   right greater trochanter, right gluteus medius, right IT band from hip to the knee, right adductors  Special tests:  Irritability (flexion/adduction/internal rotation) negative  Hip range of motion: grossly intact  Leg lengths: not tested        X-RAY:  no new images today.    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 Vertical acetabular implant stable alignment to prior. There is eccentric positioning of the femoral head within the acetabulum, grossly stable to prior xrays 10/20/2011. No obvious loosening. Possible osteolysis around the acetabular component. Lumbar and left hip degenerative changes. Degenerative changes of the pubic symphysis.    Right knee 4/17/2017:  No fracture or osseous lesion is seen. There is moderate diffuse joint space loss in the medial compartment and to a somewhat  lesser degree the lateral compartment. There is mild joint space loss in the patellofemoral articulation. Small osteophytes are seen along all the joint margins. No other abnormality is seen.          Impression:   76yo female with :  1) chronic right hip pain, trochanteric pain syndrome  2) chronic right knee pain, primary osteoarthritis.    Plan:  * might be more of the iliotibial band and thigh muscles, could try a muscle relaxant; tizanidine prescription printed out for her today.  * not sure if  repeat injections will be of benefit because its not clear the previous injections from 3 months ago really helped at all, but history is unclear.  * recommend Physical Therapy for iliotibial band stretching / massage / rolling and core exercises and gait training  * return to clinic as needed.          Alcides Laurent M.D., M.S.  Dept. of Orthopaedic Surgery  [facility]    Procedures

## 2021-05-31 RX ORDER — IBUPROFEN 400 MG/1
TABLET, FILM COATED ORAL
COMMUNITY
Start: 2021-05-07 | End: 2021-06-17

## 2021-06-03 NOTE — PROGRESS NOTES
Diagnosis/Summary/Recommendations:    PATIENT: Kamila Strauss  75 year old female     : 1945    [place]: 2021    Name: Kamila Strauss  Date of Birth 1945  [address] [place] [address]   [email or web address]  [phone] (H)      Javier Strauss  [phone]     Dr. 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 More recently, radiation boost performed 2020, to the superior right frontal lobe region. Postsurgical changes of remote high right frontoparietal craniotomy underlying resection cavity demonstrated. 1. Slightly increased size of a enhancing tumor nodule within the right posterior frontal centrum semiovale, now measuring up to 12 millimeters. Very slightly increased conspicuity of 2 nearby enhancing foci, also potentially reflecting neoplasm. Stable size of the small right frontal subcortical enhancing focus, which is indeterminate but may also reflect neoplasm. No other significant interval changes.    2021 brain mri   IMPRESSION:  1. Stable postoperative changes of right parietal craniotomy at the vertex and resection cavity in the posterior superior right parietal lobe. Stable susceptibility artifact along the margins of the resection cavity.   2. Post gadolinium imaging demonstrates interval  increased size of focal nodular enhancement along the right lateral ventricular margin. There is corresponding mild increased perfusion. Findings may represent underlying viable tumor.   3. Additional stable smaller foci of enhancement within the right posterior centrum semiovale and within the subcortical white matter of the lateral right frontal lobe. These lesions are too small to characterize on perfusion imaging.   4. No new abnormal enhancement or enhancing lesions elsewhere.   5. Moderate generalized cerebral volume loss. Scattered patchy T2/FLAIR signal hyperintensity within the white matter of both posterior hemispheres consistent with chronic small vessel ischemic changes and/or posttreatment changes.    Lamotritgine lamictal 200mg twice daily   Levetiracetam keppra 500mg twice daily   Denies seizures     Gait/Med related complications/Falls   She is in a wheelchair.  She is not walking  She has to have a elham steady with transferrring     Exercise/Therapy      Cognitive/Driving   Some short term memory problems      Mood   Diazepam valium 5mg - only for mri scanning  Sertraline zoloft 100mg x 1.5   Some anxiety  No depression      Hallucinations/delusions   No hallucinations      Sleep   No problems sleeping  Goes to bed at 1am and wakes up at 7am  Not clear if she is snoring or talking in her sleep  Her  has alzheimers and is in the same facility.      Bladder   Has bladder problems  Has spasms and will try the percutaneous tibial neuromodulation for bladder  She has some urinary urgency  Not clear she has been on mirabegron.   She has a urologist but cannot find the information  She had a bladder scan which showed minimal urine in after voiding.     GI/Constipation/GERD   Polyethylene glycol miralax as needed   Senna-docusate senokot-S 8.6-50 twice daily  Ongoing constipation issues     ENDO   Cholecalciferol Vitamin D 1000 units 25 mcg x 2   Cholecalciferol Vitamin D3 50 mcg 2000 units - not  using   Fish oil twice daily   Levothyroxine synthroid levothyroid 112 mcg    Cardio/heart   Not taking lasix     Vision      Heme   Aspirin 81mg daily  Pentoxifylline trental 400mg 3/day     Other:  Hydrocodone-acetaminophen norco 5-325  Ibuprofen advil/motrin 200mg  Ibuprofen advil/motrin 400mg    Vitamin C ascorbic acid 500mg  Vitamin E 400 units  Zinc 200 or 22mg daily    Has some softening in her voice.        Medications     7am 11am 4pm       Aspirin 81mg 1           Carbidopa/levodopa Sinemet CR 50/200        1   Carbidopa/levodopa Sinemet 25/100 2  2   2 2      Cholecalciferol Vitamin D 1000 units 25 mcg 2            Cholecalciferol Vitamin D3 50 mcg 2000 units  not taking           Hydrocodone-acetaminophen norco 5-325  as needed           Hydrocortisone valerate west-bernardo 0.2% ointment             Ibuprofen advil/motrin 400mg As needed       Ibuprofen advil/motrin 200mg  2      2     Lamotritgine lamictal 200mg  1     1      Levetiracetam keppra 500mg  1      1     Levothyroxine synthroid levothyroid 112 mcg 1            Omega 3 fatty acids fish oil  1     1     Pentoxifylline trental 400mg  1 1  1        Polyethylene glycol miralax As needed            Senna-docusate senokot-S 8.6-50  2      2     Sertraline zoloft 100mg 1.5            Tizainidine zanaflex 2mg As needed       Vitamin C ascorbic acid 500mg  1           Vitamin E 400 units  1           Zinc 200mg or 22mg 1                                                           Plan:    [facility] pharmacy consultation Elvi Garcia - to review medications along with those noted above and see if a higher dose of sinemet is helpful to manage her increased tremor.     Consider a trial of higher dose of sinemet during the day  She is taking 2 tabs 4/day and may consider 2.5 tabs 4/day of the short acting sinemet 25/10 and continue the long acting 50/200 sinemet at night.     Reset her [employer] password and she can log in and send messages via her computer.   She may  need help downloading the Attune dejuan as she did not have her iphone password to do so in the office today    Her son was granted proxy access to her records per her request and Attune was set up.     Return     Has ongoing neuro - oncological care with Dr. Camara     Coding statement:   Medical Decision Making:  #  Chronic progressive medical conditions addressed  Yes brain tumor and parkinson  Review and/or interpretation of unique test or documentation from a provider outside of neurology yes - scan reviewed   Independent historian provided additional details  No  I  Prescription drug management and review of potential side effects and/or monitoring for side effects  Yes    Health impacted by social determinants of health  no    I have reviewed the note as documented above.  This accurately captures the substance of my conversation with the patient and total time spent preparing for visit, executing visit and completing visit on the day of the visit:  60 minutes.      Darien Andrade MD     ______________________________________    Last visit date and details:             ______________________________________      Patient was asked about 14 Review of systems including changes in vision (dry eyes, double vision), hearing, heart, lungs, musculoskeletal, depression, anxiety, snoring, RBD, insomnia, urinary frequency, urinary urgency, constipation, swallowing problems, hematological, ID, allergies, skin problems: seborrhea, endocrinological: thyroid, diabetes, cholesterol; balance, weight changes, and other neurological problems and these were not significant at this time except for   Patient Active Problem List   Diagnosis     Major depressive disorder, recurrent episode (H)     Malignant Neoplasm of Cerebrum, 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     Hyperlipidemia with target LDL less than 130     Hypothyroidism     Advanced directives, counseling/discussion     Constipation     Cataract      Status post right hip replacement 1987 and 1993     Partial epilepsy with impairment of consciousness (H)     Falls frequently     Counseling for marital and partner problems     Vertebral fracture L3 post fall 8/28/2012     Bradycardia, chronic     Vestibular neuropathy     Seizure disorder (H)     History of bilat pulmonary embolism 2010     Closed fracture lateral condyle humerus     Astrocytoma (H)     Ataxia     Menopause     Malaria     Former smoker     Rotator cuff injury     Deep venous thrombosis (H)     Chronic low back pain     Pelvic fracture (H)     Impingement syndrome of right shoulder     Gait disorder     Pulmonary embolism and infarction (H)     Epilepsy (H)     Encounter for counseling     Depressive disorder     Accidental fall on or from stairs or steps     Fall     Closed fracture of condyle of humerus     Closed fracture of lumbar vertebra (H)     Osteoporosis     Basal cell carcinoma of anterior chest     Abnormal brain MRI     Health Care Home (Active)     Meibomian gland dysfunction     Fall down steps     Falls     ACP (advance care planning)     Fracture of humerus, condyle, right, closed     Depression     Lumbar vertebral fracture (H)     Parkinsonism (H)     H/O magnetic resonance imaging of brain and brain stem 2021           Allergies   Allergen Reactions     Nickel Rash     Hmg-Coa-R Inhibitors Other (See Comments) and Muscle Pain (Myalgia)     Muscle aches  Muscle aches  Muscle aches     Simvastatin Other (See Comments)     Leg Cramps     Past Surgical History:   Procedure Laterality Date     BIOPSY  8/04/15    Brain biopsy - find out if it was cancer     C RECONSTRUC HIP SOCKET      right hip replacement 1990     C TOTAL KNEE ARTHROPLASTY       HC COLONOSCOPY THRU STOMA W BIOPSY/CAUTERY TUMOR/POLYP/LESION       Past Medical History:   Diagnosis Date     Abnormal brain MRI 8/25/2014    Indication: Brain tumor, followup.  Astrocytoma.  Comparison: 04/29/2014.  Technique:  Multiplanar T1-T2, FLAIR and diffusion-weighted imaging.  Post gadolinium T1 sequences.  Findings: Compared to the prior exam, there is a stable mass within the right posterior frontal lobe at the vertex measuring approximately 3.1 x 3.8 cm in transverse and AP dimensions with predominantly low T1 and high T2 sig     Astrocytoma (H) 9/15/2013     Ataxia 9/15/2013     Back pain      Basal cell carcinoma of anterior chest 8/25/2014     Cataract      Chronic constipation      Chronic low back pain 9/16/2013     Colon polyp      Colon polyp      Deep venous thrombosis (H) 9/16/2013     Diverticulosis      Former smoker 9/16/2013     Gait disorder 8/22/2014     H/O magnetic resonance imaging of brain and brain stem 2021 4/21/2021    MR HEAD W WO CONT W PERFUSION1/7/2021 [employer] [facility] Other Result Information This result has an attachment that is not available. Result Narrative INDICATION:  Brain tumor follow up.  TECHNIQUE: Brain MRI with contrast.  The following sequences were obtained: DWI and ADC mapping sequences. Sagittal T1 weighted sequence. Axial FLAIR and MELE T2 weighted sequences. 3D T     Hip replacement      Hyperlipidemia LDL goal < 130      Hypothyroid      Inflammatory arthritis      Localization-related (focal) (partial) epilepsy and epileptic syndromes with complex partial seizures, with intractable epilepsy      Localization-related (focal) (partial) epilepsy and epileptic syndromes with complex partial seizures, without mention of intractable epilepsy      Major depressive disorder, recurrent episode, unspecified      Malignant neoplasm of cerebrum, except lobes and ventricles (H)      Osteoporosis 7/8/2013     Other convulsions      Parkinsonism (H) 4/21/2021     Seizure disorder (H)      Social History     Socioeconomic History     Marital status:      Spouse name: Not on file     Number of children: Not on file     Years of education: Not on file     Highest  education level: Not on file   Occupational History     Not on file   Social Needs     Financial resource strain: Not on file     Food insecurity     Worry: Not on file     Inability: Not on file     Transportation needs     Medical: Not on file     Non-medical: Not on file   Tobacco Use     Smoking status: Former Smoker     Quit date: 2009     Years since quittin.9     Smokeless tobacco: Never Used     Tobacco comment: QUIT 2009   Substance and Sexual Activity     Alcohol use: Yes     Comment: occassionally     Drug use: No     Sexual activity: Never   Lifestyle     Physical activity     Days per week: Not on file     Minutes per session: Not on file     Stress: Not on file   Relationships     Social connections     Talks on phone: Not on file     Gets together: Not on file     Attends Hinduism service: Not on file     Active member of club or organization: Not on file     Attends meetings of clubs or organizations: Not on file     Relationship status: Not on file     Intimate partner violence     Fear of current or ex partner: Not on file     Emotionally abused: Not on file     Physically abused: Not on file     Forced sexual activity: Not on file   Other Topics Concern     Parent/sibling w/ CABG, MI or angioplasty before 65F 55M? Not Asked   Social History Narrative        3 kids    Retired    Completed 12 yrs of school    Mother is living    Father is     Quit smoking  or                Drug and lactation database from the [place] [employer] of Medicine:  http://toxnet.nlm.nih.gov/cgi-bin/sis/htmlgen?LACT      B/P: Data Unavailable, T: Data Unavailable, P: Data Unavailable, R: Data Unavailable 0 lbs 0 oz  There were no vitals taken for this visit., There is no height or weight on file to calculate BMI.  Medications and Vitals not listed above were documented in the cart and reviewed by me.     Current Outpatient Medications   Medication Sig Dispense Refill      acetaminophen (ACETAMINOPHEN 8 HOUR) 650 MG CR tablet Take 650 mg by mouth every 6 hours as needed for mild pain or fever       acetaminophen (TYLENOL) 500 MG tablet Take 2-3 tablets by mouth every 6 hours as needed. 4 tabs qhs prn        albuterol (PROVENTIL) (2.5 MG/3ML) 0.083% neb solution        ASPIRIN 81 MG OR TABS 1 TABLET DAILY*       CALCIUM + D OR 1 TABLET DAILY       calcium carbonate (OS-MIKE 600 MG [place]. [place]) 600 MG TABS tablet Take 1,000 mg by mouth daily       carbidopa-levodopa (SINEMET CR)  MG CR tablet Take 2 tablets by mouth 3 times daily       carbidopa-levodopa (SINEMET CR)  MG per tablet Take 1 tablet by mouth At Bedtime 90 tablet 1     carbidopa-levodopa (SINEMET)  MG per tablet Take 2 tablets by mouth 3 times daily Taking 7am,11am,4pm 180 tablet 5     cholecalciferol (VITAMIN D) 1000 UNIT tablet 1000 units daily 30 tablet      Cholecalciferol (VITAMIN D3) 50 MCG (2000 UT) CAPS        cyclobenzaprine (FLEXERIL) 5 MG tablet        dexamethasone (DECADRON) 2 MG tablet        diazepam (VALIUM) 5 MG tablet 5mg tab by mouth prior to mri scan       FISH OIL 3 tablets daily.       fluocinonide (LIDEX) 0.05 % cream Apply  topically 2 times daily. 45 g 0     furosemide (LASIX) 20 MG tablet Take 20 mg by mouth       HYDROcodone-acetaminophen (NORCO) 5-325 MG per tablet Take 1 tablet by mouth every 4 hours as needed for moderate to severe pain        hydrocortisone (CORTAID) 1 % external cream Apply topically 3 times daily as needed       hydrocortisone valerate (WEST-SIENNA) 0.2 % ointment Apply topically 2 times daily Use up to two weeks then on an as needed basis to affected areas on face. 60 g 0     ibuprofen (ADVIL/MOTRIN) 200 MG tablet        ibuprofen (ADVIL/MOTRIN) 400 MG tablet        lacosamide (VIMPAT) 50 MG TABS 50 mg daily DOSE UNKNOWN; 1 tab in the morning and 1 tab in the evening 60 tablet 11     lamoTRIgine (LAMICTAL) 100 MG tablet Take 200 mg by mouth 2 times daily        lamoTRIgine (LAMICTAL) 200 MG tablet        levetiracetam (KEPPRA) 500 MG tablet Take 500 mg by mouth 2 times daily.       levetiracetam (KEPPRA) 750 MG tablet Take 750 mg by mouth 2 times daily.       levothyroxine (SYNTHROID, LEVOTHROID) 112 MCG tablet Take 1 tablet (112 mcg) by mouth daily 90 tablet 1     MAGNESIUM OR 1 TABLET DAILY       naproxen (NAPROSYN) 500 MG tablet        NYSTOP 495200 UNIT/GM powder Apply topically 2 times daily as needed        Omega-3 Fatty Acids (FISH OIL) 1200 MG capsule Take 1,200 mg by mouth 2 times daily        Ondansetron HCl (ZOFRAN PO) Take 8 mg by mouth       ORDER FOR DME Equipment being ordered: Wheelchair 1 Device 1     ORDER FOR DME Equipment being ordered: Commode seat lift without handles. 1 each 0     oxyCODONE (ROXICODONE) 5 MG IR tablet        pentoxifylline (TRENTAL) 400 MG CR tablet Take 400 mg by mouth 3 times daily (with meals)       PHENAZOPYRIDINE HCL PO Take by mouth 3 times daily as needed for urinary tract discomfort       polyethylene glycol (MIRALAX/GLYCOLAX) packet Take 17 g by mouth daily as needed       PROAIR  (90 Base) MCG/ACT inhaler        senna-docusate (SENOKOT-S;PERICOLACE) 8.6-50 MG per tablet Take 4 tablets by mouth 2 times daily        sertraline (ZOLOFT) 100 MG tablet Take 1.5 tablets (150 mg) by mouth daily 135 tablet 3     sertraline (ZOLOFT) 50 MG tablet Take 150 mg by mouth daily       tiZANidine (ZANAFLEX) 2 MG tablet Take 1 tablet (2 mg) by mouth 3 times daily as needed for muscle spasms or other (pain) 15 tablet 0     UNABLE TO FIND MEDICATION NAME: Uristat (phenazopyridine) 190 mg oral, three times a day PRN       vitamin C (ASCORBIC ACID) 500 MG tablet        vitamin E 400 UNIT capsule Take 1 capsule (400 Units) by mouth daily 30 capsule      zinc gluconate 50 MG tablet        Zinc Sulfate 220 (50 Zn) MG TABS Take 220 mg by mouth daily            Medications                                                                                                                                                   Darien Andrade MD

## 2021-06-17 ENCOUNTER — PRE VISIT (OUTPATIENT)
Dept: NEUROLOGY | Facility: CLINIC | Age: 76
End: 2021-06-17

## 2021-06-17 ENCOUNTER — OFFICE VISIT (OUTPATIENT)
Dept: NEUROLOGY | Facility: CLINIC | Age: 76
End: 2021-06-17
Payer: COMMERCIAL

## 2021-06-17 VITALS — OXYGEN SATURATION: 94 % | HEART RATE: 55 BPM | RESPIRATION RATE: 16 BRPM

## 2021-06-17 DIAGNOSIS — F80.0 ARTICULATION DISORDER: ICD-10-CM

## 2021-06-17 DIAGNOSIS — R26.9 GAIT DISORDER: Primary | ICD-10-CM

## 2021-06-17 DIAGNOSIS — G20.A1 PARKINSON'S DISEASE (H): ICD-10-CM

## 2021-06-17 PROCEDURE — 99204 OFFICE O/P NEW MOD 45 MIN: CPT | Performed by: PSYCHIATRY & NEUROLOGY

## 2021-06-17 RX ORDER — IBUPROFEN 200 MG
400 TABLET ORAL 2 TIMES DAILY
COMMUNITY
Start: 2021-06-17

## 2021-06-17 RX ORDER — ASCORBIC ACID 500 MG
500 TABLET ORAL DAILY
COMMUNITY
Start: 2021-06-17

## 2021-06-17 RX ORDER — AMOXICILLIN 250 MG
2 CAPSULE ORAL 2 TIMES DAILY
COMMUNITY
Start: 2021-06-17

## 2021-06-17 RX ORDER — CARBIDOPA AND LEVODOPA 25; 100 MG/1; MG/1
TABLET ORAL
Qty: 900 TABLET | Refills: 5 | Status: SHIPPED | OUTPATIENT
Start: 2021-06-17

## 2021-06-17 RX ORDER — CARBIDOPA AND LEVODOPA 25; 100 MG/1; MG/1
TABLET ORAL
Qty: 360 TABLET | Refills: 5 | COMMUNITY
Start: 2021-06-17 | End: 2021-06-17

## 2021-06-17 RX ORDER — CARBIDOPA AND LEVODOPA 25; 100 MG/1; MG/1
TABLET ORAL
Qty: 180 TABLET | Refills: 5 | COMMUNITY
Start: 2021-06-17 | End: 2021-06-17

## 2021-06-17 RX ORDER — CARBIDOPA AND LEVODOPA 50; 200 MG/1; MG/1
TABLET, EXTENDED RELEASE ORAL
Qty: 90 TABLET | Refills: 1 | COMMUNITY
Start: 2021-06-17

## 2021-06-17 RX ORDER — CARBIDOPA AND LEVODOPA 25; 100 MG/1; MG/1
TABLET ORAL
Qty: 900 TABLET | Refills: 5 | Status: SHIPPED | OUTPATIENT
Start: 2021-06-17 | End: 2021-06-17

## 2021-06-17 RX ORDER — IBUPROFEN 400 MG/1
400 TABLET, FILM COATED ORAL EVERY 4 HOURS PRN
COMMUNITY
Start: 2021-06-17

## 2021-06-17 RX ORDER — LAMOTRIGINE 200 MG/1
200 TABLET ORAL 2 TIMES DAILY
COMMUNITY
Start: 2021-06-17

## 2021-06-17 NOTE — NURSING NOTE
Chief Complaint   Patient presents with     Consult For     Parkinson     [facility] NEW MOVEMENT DISORDER PD       Todd Bocanegra, EMT

## 2021-06-17 NOTE — LETTER
2021       RE: Kamila Strauss  [address] [facility] [address]     Dear Colleague,    Thank you for referring your patient, Kamila Strauss, to the [facility] [place] at [facility]. Please see a copy of my visit note below.      Diagnosis/Summary/Recommendations:    PATIENT: Kamila Strauss  75 year old female     : 1945    [place]: 2021    Name: Kamila Struass  Date of Birth 1945  [address] [facility] [address]   [email or web address]  [phone] (H)      Javier Strauss  [phone]     Dr. 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 More recently, radiation boost performed 2020, to the superior right frontal lobe region. Postsurgical changes of remote high right frontoparietal craniotomy underlying resection cavity demonstrated. 1. Slightly increased size of a enhancing tumor nodule within the right posterior frontal centrum semiovale, now measuring up to 12 millimeters. Very slightly increased conspicuity of 2 nearby enhancing foci, also potentially reflecting neoplasm. Stable size of the small right frontal subcortical enhancing focus, which is indeterminate but may also reflect neoplasm. No other significant interval  changes.    4/8/2021 brain mri   IMPRESSION:  1. Stable postoperative changes of right parietal craniotomy at the vertex and resection cavity in the posterior superior right parietal lobe. Stable susceptibility artifact along the margins of the resection cavity.   2. Post gadolinium imaging demonstrates interval increased size of focal nodular enhancement along the right lateral ventricular margin. There is corresponding mild increased perfusion. Findings may represent underlying viable tumor.   3. Additional stable smaller foci of enhancement within the right posterior centrum semiovale and within the subcortical white matter of the lateral right frontal lobe. These lesions are too small to characterize on perfusion imaging.   4. No new abnormal enhancement or enhancing lesions elsewhere.   5. Moderate generalized cerebral volume loss. Scattered patchy T2/FLAIR signal hyperintensity within the white matter of both posterior hemispheres consistent with chronic small vessel ischemic changes and/or posttreatment changes.    Lamotritgine lamictal 200mg twice daily   Levetiracetam keppra 500mg twice daily   Denies seizures     Gait/Med related complications/Falls   She is in a wheelchair.  She is not walking  She has to have a elham steady with transferrring     Exercise/Therapy      Cognitive/Driving   Some short term memory problems      Mood   Diazepam valium 5mg - only for mri scanning  Sertraline zoloft 100mg x 1.5   Some anxiety  No depression      Hallucinations/delusions   No hallucinations      Sleep   No problems sleeping  Goes to bed at 1am and wakes up at 7am  Not clear if she is snoring or talking in her sleep  Her  has alzheimers and is in the same facility.      Bladder   Has bladder problems  Has spasms and will try the percutaneous tibial neuromodulation for bladder  She has some urinary urgency  Not clear she has been on mirabegron.   She has a urologist but cannot find the information  She had a  bladder scan which showed minimal urine in after voiding.     GI/Constipation/GERD   Polyethylene glycol miralax as needed   Senna-docusate senokot-S 8.6-50 twice daily  Ongoing constipation issues     ENDO   Cholecalciferol Vitamin D 1000 units 25 mcg x 2   Cholecalciferol Vitamin D3 50 mcg 2000 units - not using   Fish oil twice daily   Levothyroxine synthroid levothyroid 112 mcg    Cardio/heart   Not taking lasix     Vision      Heme   Aspirin 81mg daily  Pentoxifylline trental 400mg 3/day     Other:  Hydrocodone-acetaminophen norco 5-325  Ibuprofen advil/motrin 200mg  Ibuprofen advil/motrin 400mg    Vitamin C ascorbic acid 500mg  Vitamin E 400 units  Zinc 200 or 22mg daily    Has some softening in her voice.        Medications     7am 11am 4pm       Aspirin 81mg 1           Carbidopa/levodopa Sinemet CR 50/200        1   Carbidopa/levodopa Sinemet 25/100 2  2   2 2      Cholecalciferol Vitamin D 1000 units 25 mcg 2            Cholecalciferol Vitamin D3 50 mcg 2000 units  not taking           Hydrocodone-acetaminophen norco 5-325  as needed           Hydrocortisone valerate west-bernardo 0.2% ointment             Ibuprofen advil/motrin 400mg As needed       Ibuprofen advil/motrin 200mg  2      2     Lamotritgine lamictal 200mg  1     1      Levetiracetam keppra 500mg  1      1     Levothyroxine synthroid levothyroid 112 mcg 1            Omega 3 fatty acids fish oil  1     1     Pentoxifylline trental 400mg  1 1  1        Polyethylene glycol miralax As needed            Senna-docusate senokot-S 8.6-50  2      2     Sertraline zoloft 100mg 1.5            Tizainidine zanaflex 2mg As needed       Vitamin C ascorbic acid 500mg  1           Vitamin E 400 units  1           Zinc 200mg or 22mg 1                                                           Plan:    mtm pharmacy consultation Elvi Garcia - to review medications along with those noted above and see if a higher dose of sinemet is helpful to manage her increased  tremor.     Consider a trial of higher dose of sinemet during the day  She is taking 2 tabs 4/day and may consider 2.5 tabs 4/day of the short acting sinemet 25/10 and continue the long acting 50/200 sinemet at night.     Reset her [employer] password and she can log in and send messages via her computer.   She may need help downloading the [employer] dejuan as she did not have her iphone password to do so in the office today    Her son was granted proxy access to her records per her request and [employer] was set up.     Return     Has ongoing neuro - oncological care with Dr. Camara     Coding statement:   Medical Decision Making:  #  Chronic progressive medical conditions addressed  Yes brain tumor and parkinson  Review and/or interpretation of unique test or documentation from a provider outside of neurology yes - scan reviewed   Independent historian provided additional details  No  I  Prescription drug management and review of potential side effects and/or monitoring for side effects  Yes    Health impacted by social determinants of health  no    I have reviewed the note as documented above.  This accurately captures the substance of my conversation with the patient and total time spent preparing for visit, executing visit and completing visit on the day of the visit:  60 minutes.      Darien Andrade MD     ______________________________________    Last visit date and details:             ______________________________________      Patient was asked about 14 Review of systems including changes in vision (dry eyes, double vision), hearing, heart, lungs, musculoskeletal, depression, anxiety, snoring, RBD, insomnia, urinary frequency, urinary urgency, constipation, swallowing problems, hematological, ID, allergies, skin problems: seborrhea, endocrinological: thyroid, diabetes, cholesterol; balance, weight changes, and other neurological problems and these were not significant at this time except for   Patient Active Problem  List   Diagnosis     Major depressive disorder, recurrent episode (H)     Malignant Neoplasm of Cerebrum, 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     Hyperlipidemia with target LDL less than 130     Hypothyroidism     Advanced directives, counseling/discussion     Constipation     Cataract     Status post right hip replacement 1987 and 1993     Partial epilepsy with impairment of consciousness (H)     Falls frequently     Counseling for marital and partner problems     Vertebral fracture L3 post fall 8/28/2012     Bradycardia, chronic     Vestibular neuropathy     Seizure disorder (H)     History of bilat pulmonary embolism 2010     Closed fracture lateral condyle humerus     Astrocytoma (H)     Ataxia     Menopause     Malaria     Former smoker     Rotator cuff injury     Deep venous thrombosis (H)     Chronic low back pain     Pelvic fracture (H)     Impingement syndrome of right shoulder     Gait disorder     Pulmonary embolism and infarction (H)     Epilepsy (H)     Encounter for counseling     Depressive disorder     Accidental fall on or from stairs or steps     Fall     Closed fracture of condyle of humerus     Closed fracture of lumbar vertebra (H)     Osteoporosis     Basal cell carcinoma of anterior chest     Abnormal brain MRI     Health Care Home (Active)     Meibomian gland dysfunction     Fall down steps     Falls     ACP (advance care planning)     Fracture of humerus, condyle, right, closed     Depression     Lumbar vertebral fracture (H)     Parkinsonism (H)     H/O magnetic resonance imaging of brain and brain stem 2021           Allergies   Allergen Reactions     Nickel Rash     Hmg-Coa-R Inhibitors Other (See Comments) and Muscle Pain (Myalgia)     Muscle aches  Muscle aches  Muscle aches     Simvastatin Other (See Comments)     Leg Cramps     Past Surgical History:   Procedure Laterality Date     BIOPSY  8/04/15    Brain biopsy - find out if it was cancer     C RECONSTRUC  HIP SOCKET      right hip replacement 1990     C TOTAL KNEE ARTHROPLASTY       HC COLONOSCOPY THRU STOMA W BIOPSY/CAUTERY TUMOR/POLYP/LESION       Past Medical History:   Diagnosis Date     Abnormal brain MRI 8/25/2014    Indication: Brain tumor, followup.  Astrocytoma.  Comparison: 04/29/2014.  Technique: Multiplanar T1-T2, FLAIR and diffusion-weighted imaging.  Post gadolinium T1 sequences.  Findings: Compared to the prior exam, there is a stable mass within the right posterior frontal lobe at the vertex measuring approximately 3.1 x 3.8 cm in transverse and AP dimensions with predominantly low T1 and high T2 sig     Astrocytoma (H) 9/15/2013     Ataxia 9/15/2013     Back pain      Basal cell carcinoma of anterior chest 8/25/2014     Cataract      Chronic constipation      Chronic low back pain 9/16/2013     Colon polyp      Colon polyp      Deep venous thrombosis (H) 9/16/2013     Diverticulosis      Former smoker 9/16/2013     Gait disorder 8/22/2014     H/O magnetic resonance imaging of brain and brain stem 2021 4/21/2021    MR HEAD W WO CONT W PERFUSION1/7/2021 [facility] Other Result Information This result has an attachment that is not available. Result Narrative INDICATION:  Brain tumor follow up.  TECHNIQUE: Brain MRI with contrast.  The following sequences were obtained: DWI and ADC mapping sequences. Sagittal T1 weighted sequence. Axial FLAIR and MELE T2 weighted sequences. 3D T     Hip replacement      Hyperlipidemia LDL goal < 130      Hypothyroid      Inflammatory arthritis      Localization-related (focal) (partial) epilepsy and epileptic syndromes with complex partial seizures, with intractable epilepsy      Localization-related (focal) (partial) epilepsy and epileptic syndromes with complex partial seizures, without mention of intractable epilepsy      Major depressive disorder, recurrent episode, unspecified      Malignant neoplasm of cerebrum, except lobes and  ventricles (H)      Osteoporosis 2013     Other convulsions      Parkinsonism (H) 2021     Seizure disorder (H)      Social History     Socioeconomic History     Marital status:      Spouse name: Not on file     Number of children: Not on file     Years of education: Not on file     Highest education level: Not on file   Occupational History     Not on file   Social Needs     Financial resource strain: Not on file     Food insecurity     Worry: Not on file     Inability: Not on file     Transportation needs     Medical: Not on file     Non-medical: Not on file   Tobacco Use     Smoking status: Former Smoker     Quit date: 2009     Years since quittin.9     Smokeless tobacco: Never Used     Tobacco comment: QUIT 2009   Substance and Sexual Activity     Alcohol use: Yes     Comment: occassionally     Drug use: No     Sexual activity: Never   Lifestyle     Physical activity     Days per week: Not on file     Minutes per session: Not on file     Stress: Not on file   Relationships     Social connections     Talks on phone: Not on file     Gets together: Not on file     Attends [employer] service: Not on file     Active member of club or organization: Not on file     Attends meetings of clubs or organizations: Not on file     Relationship status: Not on file     Intimate partner violence     Fear of current or ex partner: Not on file     Emotionally abused: Not on file     Physically abused: Not on file     Forced sexual activity: Not on file   Other Topics Concern     Parent/sibling w/ CABG, MI or angioplasty before 65F 55M? Not Asked   Social History Narrative        3 kids    Retired    Completed 12 yrs of school    Mother is living    Father is     Quit smoking  or                Drug and lactation database from the [place] [employer]:  http://toxnet.nlm.nih.gov/cgi-bin/sis/htmlgen?LACT      B/P: Data Unavailable, T: Data Unavailable, P: Data  Unavailable, R: Data Unavailable 0 lbs 0 oz  There were no vitals taken for this visit., There is no height or weight on file to calculate BMI.  Medications and Vitals not listed above were documented in the cart and reviewed by me.     Current Outpatient Medications   Medication Sig Dispense Refill     acetaminophen (ACETAMINOPHEN 8 HOUR) 650 MG CR tablet Take 650 mg by mouth every 6 hours as needed for mild pain or fever       acetaminophen (TYLENOL) 500 MG tablet Take 2-3 tablets by mouth every 6 hours as needed. 4 tabs qhs prn        albuterol (PROVENTIL) (2.5 MG/3ML) 0.083% neb solution        ASPIRIN 81 MG OR TABS 1 TABLET DAILY*       CALCIUM + D OR 1 TABLET DAILY       calcium carbonate (OS-MIKE 600 MG [place]. [place]) 600 MG TABS tablet Take 1,000 mg by mouth daily       carbidopa-levodopa (SINEMET CR)  MG CR tablet Take 2 tablets by mouth 3 times daily       carbidopa-levodopa (SINEMET CR)  MG per tablet Take 1 tablet by mouth At Bedtime 90 tablet 1     carbidopa-levodopa (SINEMET)  MG per tablet Take 2 tablets by mouth 3 times daily Taking 7am,11am,4pm 180 tablet 5     cholecalciferol (VITAMIN D) 1000 UNIT tablet 1000 units daily 30 tablet      Cholecalciferol (VITAMIN D3) 50 MCG (2000 UT) CAPS        cyclobenzaprine (FLEXERIL) 5 MG tablet        dexamethasone (DECADRON) 2 MG tablet        diazepam (VALIUM) 5 MG tablet 5mg tab by mouth prior to mri scan       FISH OIL 3 tablets daily.       fluocinonide (LIDEX) 0.05 % cream Apply  topically 2 times daily. 45 g 0     furosemide (LASIX) 20 MG tablet Take 20 mg by mouth       HYDROcodone-acetaminophen (NORCO) 5-325 MG per tablet Take 1 tablet by mouth every 4 hours as needed for moderate to severe pain        hydrocortisone (CORTAID) 1 % external cream Apply topically 3 times daily as needed       hydrocortisone valerate (WEST-SIENNA) 0.2 % ointment Apply topically 2 times daily Use up to two weeks then on an as needed basis to affected areas on  face. 60 g 0     ibuprofen (ADVIL/MOTRIN) 200 MG tablet        ibuprofen (ADVIL/MOTRIN) 400 MG tablet        lacosamide (VIMPAT) 50 MG TABS 50 mg daily DOSE UNKNOWN; 1 tab in the morning and 1 tab in the evening 60 tablet 11     lamoTRIgine (LAMICTAL) 100 MG tablet Take 200 mg by mouth 2 times daily       lamoTRIgine (LAMICTAL) 200 MG tablet        levetiracetam (KEPPRA) 500 MG tablet Take 500 mg by mouth 2 times daily.       levetiracetam (KEPPRA) 750 MG tablet Take 750 mg by mouth 2 times daily.       levothyroxine (SYNTHROID, LEVOTHROID) 112 MCG tablet Take 1 tablet (112 mcg) by mouth daily 90 tablet 1     MAGNESIUM OR 1 TABLET DAILY       naproxen (NAPROSYN) 500 MG tablet        NYSTOP 456902 UNIT/GM powder Apply topically 2 times daily as needed        Omega-3 Fatty Acids (FISH OIL) 1200 MG capsule Take 1,200 mg by mouth 2 times daily        Ondansetron HCl (ZOFRAN PO) Take 8 mg by mouth       ORDER FOR DME Equipment being ordered: Wheelchair 1 Device 1     ORDER FOR DME Equipment being ordered: Commode seat lift without handles. 1 each 0     oxyCODONE (ROXICODONE) 5 MG IR tablet        pentoxifylline (TRENTAL) 400 MG CR tablet Take 400 mg by mouth 3 times daily (with meals)       PHENAZOPYRIDINE HCL PO Take by mouth 3 times daily as needed for urinary tract discomfort       polyethylene glycol (MIRALAX/GLYCOLAX) packet Take 17 g by mouth daily as needed       PROAIR  (90 Base) MCG/ACT inhaler        senna-docusate (SENOKOT-S;PERICOLACE) 8.6-50 MG per tablet Take 4 tablets by mouth 2 times daily        sertraline (ZOLOFT) 100 MG tablet Take 1.5 tablets (150 mg) by mouth daily 135 tablet 3     sertraline (ZOLOFT) 50 MG tablet Take 150 mg by mouth daily       tiZANidine (ZANAFLEX) 2 MG tablet Take 1 tablet (2 mg) by mouth 3 times daily as needed for muscle spasms or other (pain) 15 tablet 0     UNABLE TO FIND MEDICATION NAME: Uristat (phenazopyridine) 190 mg oral, three times a day PRN       vitamin C  (ASCORBIC ACID) 500 MG tablet        vitamin E 400 UNIT capsule Take 1 capsule (400 Units) by mouth daily 30 capsule      zinc gluconate 50 MG tablet        Zinc Sulfate 220 (50 Zn) MG TABS Take 220 mg by mouth daily            Medications                                                                                                                                                  Darien Andrade MD          Again, thank you for allowing me to participate in the care of your patient.      Sincerely,    Darien Andrade MD       diff. breathing.

## 2021-06-17 NOTE — PATIENT INSTRUCTIONS
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 More recently, radiation boost performed 03/2020, to the superior right frontal lobe region. Postsurgical changes of remote high right frontoparietal craniotomy underlying resection cavity demonstrated. 1. Slightly increased size of a enhancing tumor nodule within the right posterior frontal centrum semiovale, now measuring up to 12 millimeters. Very slightly increased conspicuity of 2 nearby enhancing foci, also potentially reflecting neoplasm. Stable size of the small right frontal subcortical enhancing focus, which is indeterminate but may also reflect neoplasm. No other significant interval changes.    4/8/2021 brain mri   IMPRESSION:  1. Stable postoperative changes of right parietal craniotomy at the vertex and resection cavity in the posterior superior right parietal lobe. Stable susceptibility artifact along the margins of the resection cavity.   2. Post gadolinium imaging demonstrates interval increased size of focal nodular enhancement along the right lateral ventricular margin. There is corresponding mild increased perfusion. Findings may represent underlying viable tumor.   3. Additional stable smaller foci of enhancement within the right posterior centrum semiovale and within the subcortical white matter of the lateral right frontal lobe. These lesions are too small to characterize on perfusion imaging.   4. No new abnormal enhancement or enhancing lesions elsewhere.   5. Moderate generalized cerebral volume loss. Scattered patchy T2/FLAIR signal hyperintensity within the  white matter of both posterior hemispheres consistent with chronic small vessel ischemic changes and/or posttreatment changes.    Lamotritgine lamictal 200mg twice daily   Levetiracetam keppra 500mg twice daily   Denies seizures     Gait/Med related complications/Falls   She is in a wheelchair.  She is not walking  She has to have a elham steady with transferrring     Exercise/Therapy      Cognitive/Driving   Some short term memory problems      Mood   Diazepam valium 5mg - only for mri scanning  Sertraline zoloft 100mg x 1.5   Some anxiety  No depression      Hallucinations/delusions   No hallucinations      Sleep   No problems sleeping  Goes to bed at 1am and wakes up at 7am  Not clear if she is snoring or talking in her sleep  Her  has alzheimers and is in the same facility.      Bladder   Has bladder problems  Has spasms and will try the percutaneous tibial neuromodulation for bladder  She has some urinary urgency  Not clear she has been on mirabegron.   She has a urologist but cannot find the information  She had a bladder scan which showed minimal urine in after voiding.     GI/Constipation/GERD   Polyethylene glycol miralax as needed   Senna-docusate senokot-S 8.6-50 twice daily  Ongoing constipation issues     ENDO   Cholecalciferol Vitamin D 1000 units 25 mcg x 2   Cholecalciferol Vitamin D3 50 mcg 2000 units - not using   Fish oil twice daily   Levothyroxine synthroid levothyroid 112 mcg    Cardio/heart   Not taking lasix     Vision      Heme   Aspirin 81mg daily  Pentoxifylline trental 400mg 3/day     Other:  Hydrocodone-acetaminophen norco 5-325  Ibuprofen advil/motrin 200mg  Ibuprofen advil/motrin 400mg    Vitamin C ascorbic acid 500mg  Vitamin E 400 units  Zinc 200 or 22mg daily    Has some softening in her voice.        Medications     7am 11am 4pm       Aspirin 81mg 1           Carbidopa/levodopa Sinemet CR 50/200        1   Carbidopa/levodopa Sinemet 25/100 2  2   2 2      Cholecalciferol  Vitamin D 1000 units 25 mcg 2            Cholecalciferol Vitamin D3 50 mcg 2000 units  not taking           Hydrocodone-acetaminophen norco 5-325  as needed           Hydrocortisone valerate west-bernardo 0.2% ointment             Ibuprofen advil/motrin 400mg As needed       Ibuprofen advil/motrin 200mg  2      2     Lamotritgine lamictal 200mg  1     1      Levetiracetam keppra 500mg  1      1     Levothyroxine synthroid levothyroid 112 mcg 1            Omega 3 fatty acids fish oil  1     1     Pentoxifylline trental 400mg  1 1  1        Polyethylene glycol miralax As needed            Senna-docusate senokot-S 8.6-50  2      2     Sertraline zoloft 100mg 1.5            Tizainidine zanaflex 2mg As needed       Vitamin C ascorbic acid 500mg  1           Vitamin E 400 units  1           Zinc 200mg or 22mg 1                                                           Plan:    mtm pharmacy consultation Elvi Garcia - to review medications along with those noted above and see if a higher dose of sinemet is helpful to manage her increased tremor.     Consider a trial of higher dose of sinemet during the day  She is taking 2 tabs 4/day and may consider 2.5 tabs 4/day of the short acting sinemet 25/10 and continue the long acting 50/200 sinemet at night.     Reset her [employer] password and she can log in and send messages via her computer.   She may need help downloading the [employer] dejuan as she did not have her iphone password to do so in the office today    Her son was granted proxy access to her records per her request and [employer] was set up.     Return     Has ongoing neuro - oncological care with Dr. Camara

## 2021-06-21 ENCOUNTER — TELEPHONE (OUTPATIENT)
Dept: NEUROLOGY | Facility: CLINIC | Age: 76
End: 2021-06-21

## 2021-06-21 NOTE — TELEPHONE ENCOUNTER
MTM referral from: [facility] visit (referral by provider)    MTM referral outreach attempt #2 on June 21, 2021 at 1:49 PM      Outcome: Patient not reachable after several attempts, will route to MTM Pharmacist/Provider as an FYI. Thank you for the referral.    Dong Hearn, MTM coordinator

## 2021-07-09 ENCOUNTER — TELEPHONE (OUTPATIENT)
Dept: NEUROLOGY | Facility: CLINIC | Age: 76
End: 2021-07-09

## 2021-07-09 NOTE — TELEPHONE ENCOUNTER
[facility] Call Center    Phone Message    May a detailed message be left on voicemail: yes     Reason for Call: Patient called wanting to discuss her medications. Sunny connolly. Thank you.    Action Taken: Message routed to:  Adult Clinics: Neurology p [id number]    Travel Screening: Not Applicable

## 2021-07-12 NOTE — TELEPHONE ENCOUNTER
Asked Kamila to call the  [facility] scheduling line at [phone] and set up a telephone visit with Elvi Garcia per Dr. Andrade's instructions to discuss medications.

## 2021-07-28 ENCOUNTER — MEDICAL CORRESPONDENCE (OUTPATIENT)
Dept: HEALTH INFORMATION MANAGEMENT | Facility: CLINIC | Age: 76
End: 2021-07-28

## 2021-07-28 ENCOUNTER — TELEPHONE (OUTPATIENT)
Dept: NEUROLOGY | Facility: CLINIC | Age: 76
End: 2021-07-28
Payer: COMMERCIAL

## 2021-07-28 NOTE — TELEPHONE ENCOUNTER
What is the concern that needs to be addressed by a nurse? New mincep pt, no referral but told by Dr. Andrade to see epilepsy specialist    May a detailed message be left on voicemail? yes    Date of last office visit: NA    Message routed to: [facility] new referral    7/29/21 - Received URGENT REFERRAL     Note:Seizure continue multiple times per day.  Referral for seizure management.    Fax from: [facility]

## 2021-10-23 ENCOUNTER — HEALTH MAINTENANCE LETTER (OUTPATIENT)
Age: 76
End: 2021-10-23

## 2022-02-17 PROBLEM — Z92.89 H/O MAGNETIC RESONANCE IMAGING OF BRAIN AND BRAIN STEM: Status: ACTIVE | Noted: 2021-04-21

## 2022-05-09 ENCOUNTER — MEDICAL CORRESPONDENCE (OUTPATIENT)
Dept: HEALTH INFORMATION MANAGEMENT | Facility: CLINIC | Age: 77
End: 2022-05-09
Payer: COMMERCIAL

## 2022-06-04 ENCOUNTER — HEALTH MAINTENANCE LETTER (OUTPATIENT)
Age: 77
End: 2022-06-04

## 2022-09-16 ENCOUNTER — DOCUMENTATION ONLY (OUTPATIENT)
Dept: NEUROLOGY | Facility: CLINIC | Age: 77
End: 2022-09-16

## 2022-09-16 NOTE — PROGRESS NOTES
Received office note   Records Date of service: 9/15/22  Copy has been sent to scanning and encounter routed to specialty nurse for review.

## 2022-10-10 ENCOUNTER — HEALTH MAINTENANCE LETTER (OUTPATIENT)
Age: 77
End: 2022-10-10

## 2023-06-11 ENCOUNTER — HEALTH MAINTENANCE LETTER (OUTPATIENT)
Age: 78
End: 2023-06-11